# Patient Record
Sex: FEMALE | Race: WHITE | Employment: PART TIME | URBAN - METROPOLITAN AREA
[De-identification: names, ages, dates, MRNs, and addresses within clinical notes are randomized per-mention and may not be internally consistent; named-entity substitution may affect disease eponyms.]

---

## 2017-03-16 ENCOUNTER — OFFICE VISIT (OUTPATIENT)
Dept: PRIMARY CARE CLINIC | Age: 52
End: 2017-03-16
Payer: COMMERCIAL

## 2017-03-16 ENCOUNTER — TELEPHONE (OUTPATIENT)
Dept: PRIMARY CARE CLINIC | Age: 52
End: 2017-03-16

## 2017-03-16 VITALS
SYSTOLIC BLOOD PRESSURE: 112 MMHG | TEMPERATURE: 98.7 F | WEIGHT: 174.2 LBS | DIASTOLIC BLOOD PRESSURE: 69 MMHG | HEART RATE: 100 BPM | RESPIRATION RATE: 18 BRPM | HEIGHT: 64 IN | BODY MASS INDEX: 29.74 KG/M2

## 2017-03-16 DIAGNOSIS — Z13.220 LIPID SCREENING: ICD-10-CM

## 2017-03-16 DIAGNOSIS — Z12.12 SCREENING FOR COLORECTAL CANCER: ICD-10-CM

## 2017-03-16 DIAGNOSIS — K21.9 GASTROESOPHAGEAL REFLUX DISEASE, ESOPHAGITIS PRESENCE NOT SPECIFIED: Primary | ICD-10-CM

## 2017-03-16 DIAGNOSIS — Z12.11 SCREENING FOR COLORECTAL CANCER: ICD-10-CM

## 2017-03-16 DIAGNOSIS — R10.11 RUQ PAIN: ICD-10-CM

## 2017-03-16 DIAGNOSIS — Z12.11 COLON CANCER SCREENING: Primary | ICD-10-CM

## 2017-03-16 PROCEDURE — 99214 OFFICE O/P EST MOD 30 MIN: CPT | Performed by: NURSE PRACTITIONER

## 2017-03-16 ASSESSMENT — ENCOUNTER SYMPTOMS
DIARRHEA: 1
BACK PAIN: 0
SHORTNESS OF BREATH: 0
COUGH: 0
BELCHING: 1
NAUSEA: 0
SORE THROAT: 0
RHINORRHEA: 0
HEARTBURN: 1
WHEEZING: 0
VOMITING: 0
CONSTIPATION: 0
ABDOMINAL PAIN: 1

## 2017-03-17 LAB
APPEARANCE: CLEAR
BILIRUBIN: NEGATIVE
COLOR: YELLOW
GLUCOSE BLD-MCNC: NEGATIVE MG/DL
KETONES, URINE: NEGATIVE
LEUKOCYTE ESTERASE, URINE: NEGATIVE
NITRITE, URINE: NEGATIVE
OCCULT BLOOD,URINE: ABNORMAL
PH: 6 (ref 5–9)
PROTEIN, URINE: NEGATIVE
SP GRAVITY MISCELLANEOUS: 1.02 (ref 1–1.03)
UROBILINOGEN, URINE: NORMAL

## 2017-03-18 PROBLEM — Z12.11 COLON CANCER SCREENING: Status: ACTIVE | Noted: 2017-03-18

## 2017-03-18 LAB
ABSOLUTE BASO #: 0.1 K/UL (ref 0–0.1)
ABSOLUTE EOS #: 0.1 K/UL (ref 0.1–0.4)
ABSOLUTE LYMPH #: 1.1 K/UL (ref 0.8–5.2)
ABSOLUTE MONO #: 0.4 K/UL (ref 0.1–0.9)
ABSOLUTE NEUT #: 2.3 K/UL (ref 1.3–9.1)
ALBUMIN SERPL-MCNC: 4.3 G/DL (ref 3.2–5.3)
ALK PHOSPHATASE: 66 IU/L (ref 35–121)
ALT SERPL-CCNC: 13 IU/L (ref 5–59)
ANION GAP SERPL CALCULATED.3IONS-SCNC: 17 MMOL/L
AST SERPL-CCNC: 17 IU/L (ref 10–42)
BASOPHILS RELATIVE PERCENT: 1.5 %
BILIRUB SERPL-MCNC: 0.9 MG/DL (ref 0.2–1.3)
BUN BLDV-MCNC: 15 MG/DL (ref 10–20)
CALCIUM SERPL-MCNC: 9.1 MG/DL (ref 8.7–10.8)
CHLORIDE BLD-SCNC: 104 MMOL/L (ref 95–111)
CHOLESTEROL/HDL RATIO: 2.6
CHOLESTEROL: 157 MG/DL
CO2: 22 MMOL/L (ref 21–32)
COMMENT: ABNORMAL
CREAT SERPL-MCNC: 0.8 MG/DL (ref 0.5–1.3)
EGFR AFRICAN AMERICAN: 91
EGFR IF NONAFRICAN AMERICAN: 75
EOSINOPHILS RELATIVE PERCENT: 2 %
GLUCOSE: 96 MG/DL (ref 70–100)
HCT VFR BLD CALC: 36 % (ref 36–48)
HDLC SERPL-MCNC: 61 MG/DL (ref 40–60)
HEMOGLOBIN: 10.3 G/DL (ref 12–16)
HYPOCHROMIA: ABNORMAL
LDL CHOLESTEROL CALCULATED: 84 MG/DL
LDL/HDL RATIO: 1.4
LYMPHOCYTE %: 28.9 %
MCH RBC QN AUTO: 19.7 PG (ref 27–34)
MCHC RBC AUTO-ENTMCNC: 28.6 G/DL (ref 31–36)
MCV RBC AUTO: 68.8 FL (ref 80–100)
MICROCYTES: ABNORMAL
MONOCYTES # BLD: 9.1 %
NEUTROPHILS RELATIVE PERCENT: 58.2 %
PDW BLD-RTO: 16.9 % (ref 10.8–14.8)
PLATELETS: 231 K/UL (ref 150–450)
POTASSIUM SERPL-SCNC: 4.1 MMOL/L (ref 3.5–5.4)
RBC: 5.23 M/UL (ref 4–5.5)
SODIUM BLD-SCNC: 139 MMOL/L (ref 134–147)
TOTAL PROTEIN: 7.3 G/DL (ref 5.8–8)
TRIGL SERPL-MCNC: 62 MG/DL
VLDLC SERPL CALC-MCNC: 12 MG/DL
WBC: 3.9 K/UL (ref 3.7–10.8)

## 2017-03-20 ENCOUNTER — TELEPHONE (OUTPATIENT)
Dept: PRIMARY CARE CLINIC | Age: 52
End: 2017-03-20

## 2017-03-20 DIAGNOSIS — D50.9 MICROCYTIC ANEMIA: Primary | ICD-10-CM

## 2017-03-20 LAB — PATHOLOGIST REVIEW: NORMAL

## 2017-03-22 ENCOUNTER — HOSPITAL ENCOUNTER (OUTPATIENT)
Dept: ULTRASOUND IMAGING | Age: 52
Discharge: HOME | End: 2017-03-22
Payer: COMMERCIAL

## 2017-03-22 ENCOUNTER — HOSPITAL ENCOUNTER (OUTPATIENT)
Dept: NUCLEAR MEDICINE | Age: 52
Discharge: HOME | End: 2017-03-22
Payer: COMMERCIAL

## 2017-03-22 ENCOUNTER — TELEPHONE (OUTPATIENT)
Dept: PRIMARY CARE CLINIC | Age: 52
End: 2017-03-22

## 2017-03-22 ENCOUNTER — APPOINTMENT (OUTPATIENT)
Dept: NUCLEAR MEDICINE | Age: 52
End: 2017-03-22

## 2017-03-22 DIAGNOSIS — R10.11 RUQ PAIN: ICD-10-CM

## 2017-03-22 PROCEDURE — 76705 ECHO EXAM OF ABDOMEN: CPT

## 2017-03-22 PROCEDURE — 78226 HEPATOBILIARY SYSTEM IMAGING: CPT

## 2017-03-22 PROCEDURE — A9537 TC99M MEBROFENIN: HCPCS | Performed by: NURSE PRACTITIONER

## 2017-03-22 PROCEDURE — 3430000000 HC RX DIAGNOSTIC RADIOPHARMACEUTICAL: Performed by: NURSE PRACTITIONER

## 2017-03-22 RX ADMIN — Medication 5 MILLICURIE: at 11:05

## 2017-04-05 ENCOUNTER — ANESTHESIA EVENT (OUTPATIENT)
Dept: OPERATING ROOM | Age: 52
End: 2017-04-05
Payer: COMMERCIAL

## 2017-04-05 ENCOUNTER — HOSPITAL ENCOUNTER (OUTPATIENT)
Age: 52
Setting detail: OUTPATIENT SURGERY
Discharge: HOME | End: 2017-04-05
Attending: INTERNAL MEDICINE | Admitting: INTERNAL MEDICINE
Payer: COMMERCIAL

## 2017-04-05 ENCOUNTER — ANESTHESIA (OUTPATIENT)
Dept: OPERATING ROOM | Age: 52
End: 2017-04-05
Payer: COMMERCIAL

## 2017-04-05 VITALS
HEART RATE: 68 BPM | RESPIRATION RATE: 16 BRPM | OXYGEN SATURATION: 97 % | HEIGHT: 65 IN | SYSTOLIC BLOOD PRESSURE: 104 MMHG | BODY MASS INDEX: 27.99 KG/M2 | WEIGHT: 168 LBS | DIASTOLIC BLOOD PRESSURE: 52 MMHG | TEMPERATURE: 97.4 F

## 2017-04-05 VITALS
RESPIRATION RATE: 17 BRPM | OXYGEN SATURATION: 98 % | DIASTOLIC BLOOD PRESSURE: 43 MMHG | SYSTOLIC BLOOD PRESSURE: 80 MMHG

## 2017-04-05 PROCEDURE — 2580000003 HC RX 258: Performed by: INTERNAL MEDICINE

## 2017-04-05 PROCEDURE — 2500000003 HC RX 250 WO HCPCS: Performed by: NURSE ANESTHETIST, CERTIFIED REGISTERED

## 2017-04-05 PROCEDURE — 7100000010 HC PHASE II RECOVERY - FIRST 15 MIN: Performed by: INTERNAL MEDICINE

## 2017-04-05 PROCEDURE — 45378 DIAGNOSTIC COLONOSCOPY: CPT | Performed by: INTERNAL MEDICINE

## 2017-04-05 PROCEDURE — 3700000000 HC ANESTHESIA ATTENDED CARE: Performed by: INTERNAL MEDICINE

## 2017-04-05 PROCEDURE — 7100000011 HC PHASE II RECOVERY - ADDTL 15 MIN: Performed by: INTERNAL MEDICINE

## 2017-04-05 PROCEDURE — 3609027000 HC COLONOSCOPY: Performed by: INTERNAL MEDICINE

## 2017-04-05 PROCEDURE — 3700000001 HC ADD 15 MINUTES (ANESTHESIA): Performed by: INTERNAL MEDICINE

## 2017-04-05 PROCEDURE — 6360000002 HC RX W HCPCS: Performed by: NURSE ANESTHETIST, CERTIFIED REGISTERED

## 2017-04-05 RX ORDER — SODIUM CHLORIDE, SODIUM LACTATE, POTASSIUM CHLORIDE, CALCIUM CHLORIDE 600; 310; 30; 20 MG/100ML; MG/100ML; MG/100ML; MG/100ML
INJECTION, SOLUTION INTRAVENOUS CONTINUOUS
Status: DISCONTINUED | OUTPATIENT
Start: 2017-04-05 | End: 2017-04-05 | Stop reason: HOSPADM

## 2017-04-05 RX ORDER — LIDOCAINE HYDROCHLORIDE 20 MG/ML
INJECTION, SOLUTION INFILTRATION; PERINEURAL PRN
Status: DISCONTINUED | OUTPATIENT
Start: 2017-04-05 | End: 2017-04-05 | Stop reason: SDUPTHER

## 2017-04-05 RX ORDER — PROPOFOL 10 MG/ML
INJECTION, EMULSION INTRAVENOUS CONTINUOUS PRN
Status: DISCONTINUED | OUTPATIENT
Start: 2017-04-05 | End: 2017-04-05 | Stop reason: SDUPTHER

## 2017-04-05 RX ADMIN — PROPOFOL 120 MCG/KG/MIN: 10 INJECTION, EMULSION INTRAVENOUS at 15:05

## 2017-04-05 RX ADMIN — LIDOCAINE HYDROCHLORIDE 100 MG: 20 INJECTION, SOLUTION INFILTRATION; PERINEURAL at 15:06

## 2017-04-05 RX ADMIN — SODIUM CHLORIDE, POTASSIUM CHLORIDE, SODIUM LACTATE AND CALCIUM CHLORIDE: 600; 310; 30; 20 INJECTION, SOLUTION INTRAVENOUS at 14:33

## 2017-04-05 ASSESSMENT — PAIN - FUNCTIONAL ASSESSMENT: PAIN_FUNCTIONAL_ASSESSMENT: 0-10

## 2017-04-05 ASSESSMENT — PAIN SCALES - GENERAL
PAINLEVEL_OUTOF10: 0

## 2017-05-02 ENCOUNTER — TELEPHONE (OUTPATIENT)
Dept: PRIMARY CARE CLINIC | Age: 52
End: 2017-05-02

## 2017-05-08 LAB
FERRITIN: 3.2 NG/ML (ref 9–150)
IRON: 16
TOTAL IRON BINDING CAPACITY: 423
VITAMIN B-12: 768

## 2017-05-10 ENCOUNTER — TELEPHONE (OUTPATIENT)
Dept: PRIMARY CARE CLINIC | Age: 52
End: 2017-05-10

## 2017-05-10 DIAGNOSIS — D50.9 MICROCYTIC ANEMIA: ICD-10-CM

## 2017-05-16 ENCOUNTER — TELEPHONE (OUTPATIENT)
Dept: PRIMARY CARE CLINIC | Age: 52
End: 2017-05-16

## 2017-05-16 DIAGNOSIS — D50.9 MICROCYTIC ANEMIA: Primary | ICD-10-CM

## 2017-07-14 LAB
ABSOLUTE BASO #: 0.1 K/UL (ref 0–0.1)
ABSOLUTE EOS #: 0.1 K/UL (ref 0.1–0.4)
ABSOLUTE LYMPH #: 1.5 K/UL (ref 0.8–5.2)
ABSOLUTE MONO #: 0.5 K/UL (ref 0.1–0.9)
ABSOLUTE NEUT #: 3.1 K/UL (ref 1.3–9.1)
BASOPHILS RELATIVE PERCENT: 1.5 %
EOSINOPHILS RELATIVE PERCENT: 2.1 %
FERRITIN: 6.9 NG/ML (ref 10–291)
HCT VFR BLD CALC: 38 % (ref 36–48)
HEMOGLOBIN: 11.6 G/DL (ref 12–16)
LYMPHOCYTE %: 27.9 %
MCH RBC QN AUTO: 22.5 PG (ref 27–34)
MCHC RBC AUTO-ENTMCNC: 30.5 G/DL (ref 31–36)
MCV RBC AUTO: 73.8 FL (ref 80–100)
MONOCYTES # BLD: 9.9 %
NEUTROPHILS RELATIVE PERCENT: 58.4 %
PDW BLD-RTO: 20.9 % (ref 10.8–14.8)
PLATELETS: 171 K/UL (ref 150–450)
RBC: 5.15 M/UL (ref 4–5.5)
WBC: 5.3 K/UL (ref 3.7–10.8)

## 2017-07-15 LAB
IRON SATURATION: 10 % (ref 20–50)
IRON, SERUM: 41 UG/DL (ref 37–145)
TOTAL IRON BINDING CAPACITY: 407 UG/DL (ref 250–450)
UNSATURATED IRON BINDING CAPACITY: 366 UG/DL (ref 112–347)

## 2017-07-17 ENCOUNTER — TELEPHONE (OUTPATIENT)
Dept: PRIMARY CARE CLINIC | Age: 52
End: 2017-07-17

## 2017-07-17 DIAGNOSIS — D50.9 MICROCYTIC ANEMIA: Primary | ICD-10-CM

## 2017-07-20 ENCOUNTER — TELEPHONE (OUTPATIENT)
Dept: ONCOLOGY | Age: 52
End: 2017-07-20

## 2017-07-20 ENCOUNTER — OFFICE VISIT (OUTPATIENT)
Dept: ONCOLOGY | Age: 52
End: 2017-07-20
Payer: COMMERCIAL

## 2017-07-20 VITALS
TEMPERATURE: 98.6 F | SYSTOLIC BLOOD PRESSURE: 146 MMHG | HEART RATE: 90 BPM | WEIGHT: 176.8 LBS | DIASTOLIC BLOOD PRESSURE: 73 MMHG | BODY MASS INDEX: 29.46 KG/M2 | RESPIRATION RATE: 26 BRPM | HEIGHT: 65 IN

## 2017-07-20 DIAGNOSIS — K90.9 INTESTINAL MALABSORPTION, UNSPECIFIED TYPE: ICD-10-CM

## 2017-07-20 DIAGNOSIS — D50.8 OTHER IRON DEFICIENCY ANEMIA: ICD-10-CM

## 2017-07-20 DIAGNOSIS — D50.9 MICROCYTIC ANEMIA: Primary | ICD-10-CM

## 2017-07-20 DIAGNOSIS — K21.9 GASTROESOPHAGEAL REFLUX DISEASE, ESOPHAGITIS PRESENCE NOT SPECIFIED: ICD-10-CM

## 2017-07-20 DIAGNOSIS — R13.10 DYSPHAGIA, UNSPECIFIED TYPE: ICD-10-CM

## 2017-07-20 PROCEDURE — 99204 OFFICE O/P NEW MOD 45 MIN: CPT | Performed by: INTERNAL MEDICINE

## 2017-07-20 PROCEDURE — G8427 DOCREV CUR MEDS BY ELIG CLIN: HCPCS | Performed by: INTERNAL MEDICINE

## 2017-07-20 PROCEDURE — 3014F SCREEN MAMMO DOC REV: CPT | Performed by: INTERNAL MEDICINE

## 2017-07-20 PROCEDURE — G8419 CALC BMI OUT NRM PARAM NOF/U: HCPCS | Performed by: INTERNAL MEDICINE

## 2017-07-20 PROCEDURE — 1036F TOBACCO NON-USER: CPT | Performed by: INTERNAL MEDICINE

## 2017-07-20 PROCEDURE — 3017F COLORECTAL CA SCREEN DOC REV: CPT | Performed by: INTERNAL MEDICINE

## 2017-07-20 RX ORDER — SODIUM CHLORIDE 0.9 % (FLUSH) 0.9 %
10 SYRINGE (ML) INJECTION PRN
Status: CANCELLED | OUTPATIENT
Start: 2017-07-24

## 2017-07-20 RX ORDER — 0.9 % SODIUM CHLORIDE 0.9 %
10 VIAL (ML) INJECTION ONCE
Status: CANCELLED | OUTPATIENT
Start: 2017-07-24 | End: 2017-07-24

## 2017-07-20 RX ORDER — SODIUM CHLORIDE 9 MG/ML
INJECTION, SOLUTION INTRAVENOUS ONCE
Status: CANCELLED | OUTPATIENT
Start: 2017-07-24 | End: 2017-07-24

## 2017-07-20 RX ORDER — DIPHENHYDRAMINE HYDROCHLORIDE 50 MG/ML
50 INJECTION INTRAMUSCULAR; INTRAVENOUS ONCE
Status: CANCELLED | OUTPATIENT
Start: 2017-07-24 | End: 2017-07-24

## 2017-07-20 RX ORDER — METHYLPREDNISOLONE SODIUM SUCCINATE 125 MG/2ML
125 INJECTION, POWDER, LYOPHILIZED, FOR SOLUTION INTRAMUSCULAR; INTRAVENOUS ONCE
Status: CANCELLED | OUTPATIENT
Start: 2017-07-24 | End: 2017-07-24

## 2017-07-20 RX ORDER — SODIUM CHLORIDE 0.9 % (FLUSH) 0.9 %
5 SYRINGE (ML) INJECTION PRN
Status: CANCELLED | OUTPATIENT
Start: 2017-07-24

## 2017-07-20 RX ORDER — SODIUM CHLORIDE 9 MG/ML
100 INJECTION, SOLUTION INTRAVENOUS CONTINUOUS
Status: CANCELLED | OUTPATIENT
Start: 2017-07-24

## 2017-07-20 RX ORDER — HEPARIN SODIUM (PORCINE) LOCK FLUSH IV SOLN 100 UNIT/ML 100 UNIT/ML
500 SOLUTION INTRAVENOUS PRN
Status: CANCELLED | OUTPATIENT
Start: 2017-07-24

## 2017-07-20 ASSESSMENT — ENCOUNTER SYMPTOMS
COUGH: 0
WHEEZING: 0
NAUSEA: 0
COLOR CHANGE: 0
CONSTIPATION: 0
BLOOD IN STOOL: 0
DIARRHEA: 0
EYE ITCHING: 0
SHORTNESS OF BREATH: 0
VOMITING: 0
BACK PAIN: 0
CHEST TIGHTNESS: 0
ABDOMINAL PAIN: 0
EYE REDNESS: 0

## 2017-07-21 ENCOUNTER — HOSPITAL ENCOUNTER (OUTPATIENT)
Dept: INFUSION THERAPY | Age: 52
Discharge: HOME | End: 2017-07-21
Payer: COMMERCIAL

## 2017-07-21 VITALS
TEMPERATURE: 98.1 F | SYSTOLIC BLOOD PRESSURE: 103 MMHG | DIASTOLIC BLOOD PRESSURE: 57 MMHG | HEART RATE: 59 BPM | RESPIRATION RATE: 20 BRPM

## 2017-07-21 DIAGNOSIS — K90.9 INTESTINAL MALABSORPTION, UNSPECIFIED TYPE: ICD-10-CM

## 2017-07-21 DIAGNOSIS — D50.8 OTHER IRON DEFICIENCY ANEMIA: ICD-10-CM

## 2017-07-21 PROCEDURE — 96365 THER/PROPH/DIAG IV INF INIT: CPT

## 2017-07-21 PROCEDURE — 6360000002 HC RX W HCPCS: Performed by: INTERNAL MEDICINE

## 2017-07-21 PROCEDURE — 2580000003 HC RX 258: Performed by: INTERNAL MEDICINE

## 2017-07-21 RX ORDER — SODIUM CHLORIDE 9 MG/ML
INJECTION, SOLUTION INTRAVENOUS ONCE
Status: CANCELLED | OUTPATIENT
Start: 2017-07-24 | End: 2017-07-24

## 2017-07-21 RX ORDER — SODIUM CHLORIDE 0.9 % (FLUSH) 0.9 %
5 SYRINGE (ML) INJECTION PRN
Status: CANCELLED | OUTPATIENT
Start: 2017-07-24

## 2017-07-21 RX ORDER — METHYLPREDNISOLONE SODIUM SUCCINATE 125 MG/2ML
125 INJECTION, POWDER, LYOPHILIZED, FOR SOLUTION INTRAMUSCULAR; INTRAVENOUS ONCE
Status: CANCELLED | OUTPATIENT
Start: 2017-07-24 | End: 2017-07-24

## 2017-07-21 RX ORDER — DIPHENHYDRAMINE HYDROCHLORIDE 50 MG/ML
50 INJECTION INTRAMUSCULAR; INTRAVENOUS ONCE
Status: CANCELLED | OUTPATIENT
Start: 2017-07-24 | End: 2017-07-24

## 2017-07-21 RX ORDER — 0.9 % SODIUM CHLORIDE 0.9 %
10 VIAL (ML) INJECTION ONCE
Status: CANCELLED | OUTPATIENT
Start: 2017-07-24 | End: 2017-07-24

## 2017-07-21 RX ORDER — SODIUM CHLORIDE 9 MG/ML
100 INJECTION, SOLUTION INTRAVENOUS CONTINUOUS
Status: CANCELLED | OUTPATIENT
Start: 2017-07-24

## 2017-07-21 RX ORDER — SODIUM CHLORIDE 9 MG/ML
INJECTION, SOLUTION INTRAVENOUS ONCE
Status: COMPLETED | OUTPATIENT
Start: 2017-07-21 | End: 2017-07-21

## 2017-07-21 RX ORDER — SODIUM CHLORIDE 0.9 % (FLUSH) 0.9 %
10 SYRINGE (ML) INJECTION PRN
Status: CANCELLED | OUTPATIENT
Start: 2017-07-24

## 2017-07-21 RX ORDER — SODIUM CHLORIDE 0.9 % (FLUSH) 0.9 %
10 SYRINGE (ML) INJECTION PRN
Status: DISCONTINUED | OUTPATIENT
Start: 2017-07-21 | End: 2017-07-22 | Stop reason: HOSPADM

## 2017-07-21 RX ORDER — HEPARIN SODIUM (PORCINE) LOCK FLUSH IV SOLN 100 UNIT/ML 100 UNIT/ML
500 SOLUTION INTRAVENOUS PRN
Status: CANCELLED | OUTPATIENT
Start: 2017-07-24

## 2017-07-21 RX ADMIN — SODIUM CHLORIDE: 9 INJECTION, SOLUTION INTRAVENOUS at 12:23

## 2017-07-21 RX ADMIN — Medication 10 ML: at 12:20

## 2017-07-21 RX ADMIN — FERUMOXYTOL 510 MG: 510 INJECTION INTRAVENOUS at 12:28

## 2017-07-28 ENCOUNTER — HOSPITAL ENCOUNTER (OUTPATIENT)
Dept: INFUSION THERAPY | Age: 52
Discharge: HOME | End: 2017-07-28
Payer: COMMERCIAL

## 2017-07-28 VITALS — TEMPERATURE: 98 F | SYSTOLIC BLOOD PRESSURE: 103 MMHG | HEART RATE: 59 BPM | DIASTOLIC BLOOD PRESSURE: 62 MMHG

## 2017-07-28 DIAGNOSIS — K90.9 INTESTINAL MALABSORPTION, UNSPECIFIED TYPE: ICD-10-CM

## 2017-07-28 DIAGNOSIS — D50.8 OTHER IRON DEFICIENCY ANEMIA: ICD-10-CM

## 2017-07-28 DIAGNOSIS — D50.9 IRON DEFICIENCY ANEMIA, UNSPECIFIED IRON DEFICIENCY ANEMIA TYPE: ICD-10-CM

## 2017-07-28 PROCEDURE — 96365 THER/PROPH/DIAG IV INF INIT: CPT

## 2017-07-28 PROCEDURE — 6360000002 HC RX W HCPCS: Performed by: INTERNAL MEDICINE

## 2017-07-28 PROCEDURE — 2580000003 HC RX 258: Performed by: INTERNAL MEDICINE

## 2017-07-28 RX ORDER — 0.9 % SODIUM CHLORIDE 0.9 %
10 VIAL (ML) INJECTION ONCE
Status: CANCELLED | OUTPATIENT
Start: 2017-07-28 | End: 2017-07-28

## 2017-07-28 RX ORDER — DIPHENHYDRAMINE HYDROCHLORIDE 50 MG/ML
50 INJECTION INTRAMUSCULAR; INTRAVENOUS ONCE
Status: CANCELLED | OUTPATIENT
Start: 2017-07-28 | End: 2017-07-28

## 2017-07-28 RX ORDER — HEPARIN SODIUM (PORCINE) LOCK FLUSH IV SOLN 100 UNIT/ML 100 UNIT/ML
500 SOLUTION INTRAVENOUS PRN
Status: CANCELLED | OUTPATIENT
Start: 2017-07-28

## 2017-07-28 RX ORDER — SODIUM CHLORIDE 0.9 % (FLUSH) 0.9 %
10 SYRINGE (ML) INJECTION PRN
Status: DISCONTINUED | OUTPATIENT
Start: 2017-07-28 | End: 2017-07-29 | Stop reason: HOSPADM

## 2017-07-28 RX ORDER — SODIUM CHLORIDE 9 MG/ML
INJECTION, SOLUTION INTRAVENOUS ONCE
Status: COMPLETED | OUTPATIENT
Start: 2017-07-28 | End: 2017-07-28

## 2017-07-28 RX ORDER — SODIUM CHLORIDE 9 MG/ML
INJECTION, SOLUTION INTRAVENOUS ONCE
Status: CANCELLED | OUTPATIENT
Start: 2017-07-28 | End: 2017-07-28

## 2017-07-28 RX ORDER — SODIUM CHLORIDE 9 MG/ML
100 INJECTION, SOLUTION INTRAVENOUS CONTINUOUS
Status: CANCELLED | OUTPATIENT
Start: 2017-07-28

## 2017-07-28 RX ORDER — SODIUM CHLORIDE 0.9 % (FLUSH) 0.9 %
5 SYRINGE (ML) INJECTION PRN
Status: CANCELLED | OUTPATIENT
Start: 2017-07-28

## 2017-07-28 RX ORDER — METHYLPREDNISOLONE SODIUM SUCCINATE 125 MG/2ML
125 INJECTION, POWDER, LYOPHILIZED, FOR SOLUTION INTRAMUSCULAR; INTRAVENOUS ONCE
Status: CANCELLED | OUTPATIENT
Start: 2017-07-28 | End: 2017-07-28

## 2017-07-28 RX ORDER — SODIUM CHLORIDE 0.9 % (FLUSH) 0.9 %
10 SYRINGE (ML) INJECTION PRN
Status: CANCELLED | OUTPATIENT
Start: 2017-07-28

## 2017-07-28 RX ADMIN — Medication 10 ML: at 09:05

## 2017-07-28 RX ADMIN — SODIUM CHLORIDE: 9 INJECTION, SOLUTION INTRAVENOUS at 09:12

## 2017-07-28 RX ADMIN — FERUMOXYTOL 510 MG: 510 INJECTION INTRAVENOUS at 09:25

## 2017-08-24 ENCOUNTER — OFFICE VISIT (OUTPATIENT)
Dept: ONCOLOGY | Age: 52
End: 2017-08-24
Payer: COMMERCIAL

## 2017-08-24 VITALS
WEIGHT: 178 LBS | SYSTOLIC BLOOD PRESSURE: 123 MMHG | BODY MASS INDEX: 29.66 KG/M2 | RESPIRATION RATE: 16 BRPM | DIASTOLIC BLOOD PRESSURE: 80 MMHG | HEIGHT: 65 IN | HEART RATE: 74 BPM | TEMPERATURE: 98.3 F

## 2017-08-24 DIAGNOSIS — D50.9 IRON DEFICIENCY ANEMIA, UNSPECIFIED IRON DEFICIENCY ANEMIA TYPE: ICD-10-CM

## 2017-08-24 DIAGNOSIS — K21.9 GASTROESOPHAGEAL REFLUX DISEASE, ESOPHAGITIS PRESENCE NOT SPECIFIED: ICD-10-CM

## 2017-08-24 DIAGNOSIS — K90.9 INTESTINAL MALABSORPTION, UNSPECIFIED TYPE: ICD-10-CM

## 2017-08-24 DIAGNOSIS — D50.9 MICROCYTIC ANEMIA: ICD-10-CM

## 2017-08-24 DIAGNOSIS — D50.9 MICROCYTIC ANEMIA: Primary | ICD-10-CM

## 2017-08-24 DIAGNOSIS — R13.10 DYSPHAGIA, UNSPECIFIED TYPE: ICD-10-CM

## 2017-08-24 PROCEDURE — 1036F TOBACCO NON-USER: CPT | Performed by: INTERNAL MEDICINE

## 2017-08-24 PROCEDURE — 99213 OFFICE O/P EST LOW 20 MIN: CPT | Performed by: INTERNAL MEDICINE

## 2017-08-24 PROCEDURE — 3017F COLORECTAL CA SCREEN DOC REV: CPT | Performed by: INTERNAL MEDICINE

## 2017-08-24 PROCEDURE — G8427 DOCREV CUR MEDS BY ELIG CLIN: HCPCS | Performed by: INTERNAL MEDICINE

## 2017-08-24 PROCEDURE — G8417 CALC BMI ABV UP PARAM F/U: HCPCS | Performed by: INTERNAL MEDICINE

## 2017-08-24 PROCEDURE — 3014F SCREEN MAMMO DOC REV: CPT | Performed by: INTERNAL MEDICINE

## 2017-08-24 ASSESSMENT — ENCOUNTER SYMPTOMS
BACK PAIN: 0
ABDOMINAL PAIN: 0
CHEST TIGHTNESS: 0
EYE REDNESS: 0
EYE ITCHING: 0
VOMITING: 0
CONSTIPATION: 0
BLOOD IN STOOL: 0
DIARRHEA: 0
COLOR CHANGE: 0
SHORTNESS OF BREATH: 0
NAUSEA: 0
WHEEZING: 0
COUGH: 0

## 2017-08-30 ENCOUNTER — OFFICE VISIT (OUTPATIENT)
Dept: PRIMARY CARE CLINIC | Age: 52
End: 2017-08-30
Payer: COMMERCIAL

## 2017-08-30 VITALS
RESPIRATION RATE: 18 BRPM | BODY MASS INDEX: 29.39 KG/M2 | DIASTOLIC BLOOD PRESSURE: 76 MMHG | WEIGHT: 176.4 LBS | TEMPERATURE: 98 F | SYSTOLIC BLOOD PRESSURE: 117 MMHG | HEIGHT: 65 IN | HEART RATE: 64 BPM

## 2017-08-30 DIAGNOSIS — R53.83 FATIGUE, UNSPECIFIED TYPE: Primary | ICD-10-CM

## 2017-08-30 DIAGNOSIS — R13.10 DYSPHAGIA, UNSPECIFIED TYPE: ICD-10-CM

## 2017-08-30 DIAGNOSIS — R31.29 MICROHEMATURIA: ICD-10-CM

## 2017-08-30 PROCEDURE — G8427 DOCREV CUR MEDS BY ELIG CLIN: HCPCS | Performed by: NURSE PRACTITIONER

## 2017-08-30 PROCEDURE — 3014F SCREEN MAMMO DOC REV: CPT | Performed by: NURSE PRACTITIONER

## 2017-08-30 PROCEDURE — 99214 OFFICE O/P EST MOD 30 MIN: CPT | Performed by: NURSE PRACTITIONER

## 2017-08-30 PROCEDURE — 3017F COLORECTAL CA SCREEN DOC REV: CPT | Performed by: NURSE PRACTITIONER

## 2017-08-30 PROCEDURE — 1036F TOBACCO NON-USER: CPT | Performed by: NURSE PRACTITIONER

## 2017-08-30 PROCEDURE — G8417 CALC BMI ABV UP PARAM F/U: HCPCS | Performed by: NURSE PRACTITIONER

## 2017-08-30 ASSESSMENT — ENCOUNTER SYMPTOMS
COUGH: 0
VOMITING: 0
SWOLLEN GLANDS: 0
VISUAL CHANGE: 0
CHANGE IN BOWEL HABIT: 0
NAUSEA: 0
ABDOMINAL PAIN: 0
SORE THROAT: 1

## 2017-08-30 ASSESSMENT — PATIENT HEALTH QUESTIONNAIRE - PHQ9
SUM OF ALL RESPONSES TO PHQ QUESTIONS 1-9: 0
SUM OF ALL RESPONSES TO PHQ9 QUESTIONS 1 & 2: 0
2. FEELING DOWN, DEPRESSED OR HOPELESS: 0
1. LITTLE INTEREST OR PLEASURE IN DOING THINGS: 0

## 2017-08-31 ENCOUNTER — HOSPITAL ENCOUNTER (OUTPATIENT)
Dept: ULTRASOUND IMAGING | Age: 52
Discharge: HOME | End: 2017-08-31
Payer: COMMERCIAL

## 2017-08-31 DIAGNOSIS — R53.83 FATIGUE, UNSPECIFIED TYPE: ICD-10-CM

## 2017-08-31 DIAGNOSIS — R13.10 DYSPHAGIA, UNSPECIFIED TYPE: ICD-10-CM

## 2017-08-31 PROCEDURE — 76536 US EXAM OF HEAD AND NECK: CPT

## 2017-09-01 LAB
APPEARANCE: CLEAR
BILIRUBIN: NEGATIVE
COLOR: YELLOW
GLUCOSE BLD-MCNC: NEGATIVE MG/DL
KETONES, URINE: NEGATIVE
LEUKOCYTE ESTERASE, URINE: NEGATIVE
NITRITE, URINE: NEGATIVE
OCCULT BLOOD,URINE: NEGATIVE
PH: 7 (ref 5–9)
PROTEIN, URINE: NEGATIVE
SP GRAVITY MISCELLANEOUS: <1.005 (ref 1–1.03)
T4 FREE: 0.97 NG/DL (ref 0.8–1.8)
TSH SERPL DL<=0.05 MIU/L-ACNC: 2 UIU/ML (ref 0.4–4.4)
UROBILINOGEN, URINE: NORMAL

## 2017-09-06 ENCOUNTER — TELEPHONE (OUTPATIENT)
Dept: PRIMARY CARE CLINIC | Age: 52
End: 2017-09-06

## 2017-09-07 ENCOUNTER — TELEPHONE (OUTPATIENT)
Dept: PRIMARY CARE CLINIC | Age: 52
End: 2017-09-07

## 2017-11-21 DIAGNOSIS — K90.9 INTESTINAL MALABSORPTION, UNSPECIFIED TYPE: ICD-10-CM

## 2017-11-21 DIAGNOSIS — D50.9 MICROCYTIC ANEMIA: ICD-10-CM

## 2017-11-21 DIAGNOSIS — K21.9 GASTROESOPHAGEAL REFLUX DISEASE, ESOPHAGITIS PRESENCE NOT SPECIFIED: ICD-10-CM

## 2017-11-21 DIAGNOSIS — R13.10 DYSPHAGIA, UNSPECIFIED TYPE: ICD-10-CM

## 2017-11-21 DIAGNOSIS — D50.9 IRON DEFICIENCY ANEMIA, UNSPECIFIED IRON DEFICIENCY ANEMIA TYPE: ICD-10-CM

## 2017-12-05 ENCOUNTER — OFFICE VISIT (OUTPATIENT)
Dept: OBGYN | Age: 52
End: 2017-12-05
Payer: COMMERCIAL

## 2017-12-05 VITALS
DIASTOLIC BLOOD PRESSURE: 80 MMHG | SYSTOLIC BLOOD PRESSURE: 122 MMHG | HEIGHT: 65 IN | BODY MASS INDEX: 29.49 KG/M2 | WEIGHT: 177 LBS

## 2017-12-05 DIAGNOSIS — Z01.419 WOMEN'S ANNUAL ROUTINE GYNECOLOGICAL EXAMINATION: Primary | ICD-10-CM

## 2017-12-05 PROCEDURE — 99396 PREV VISIT EST AGE 40-64: CPT | Performed by: ADVANCED PRACTICE MIDWIFE

## 2017-12-05 ASSESSMENT — ENCOUNTER SYMPTOMS: SORE THROAT: 0

## 2017-12-05 NOTE — PROGRESS NOTES
YEARLY PHYSICAL    Date of service: 2017    Etienne Souza  Is a 46 y.o.   female    PT's PCP is: Shalini Williamson CNP     : 1965                                             Subjective:       Patient's last menstrual period was 2017 (exact date).      Are your menses regular: yes    OB History    Para Term  AB Living   4       2 2   SAB TAB Ectopic Molar Multiple Live Births   2                # Outcome Date GA Lbr Roger/2nd Weight Sex Delivery Anes PTL Lv   4             3             2 SAB            1 SAB                    History   Smoking Status    Never Smoker   Smokeless Tobacco    Never Used        History   Alcohol Use    Yes     Comment: Occ       Family History   Problem Relation Age of Onset    High Blood Pressure Mother     High Blood Pressure Father     Other Father     Kidney Disease Maternal Grandmother     Stroke Maternal Grandfather     Heart Disease Maternal Grandfather     Cancer Paternal Grandmother     Heart Disease Paternal Grandfather        Allergies: Penicillins; Vancomycin; and Wellbutrin [bupropion]      Current Outpatient Prescriptions:     Prenatal Vit-Iron Carbonyl-FA (PRENATAL PLUS IRON PO), Take by mouth OTC, Disp: , Rfl:     History   Sexual Activity    Sexual activity: Yes    Partners: Male       Any bleeding or pain with intercourse: No    Last Yearly:  2016    Last pap: 2016    Last HPV: 2016    Last Mammogram: 2016    Last Dexascan never    Do you do self breast exams: Yes    Past Medical History:   Diagnosis Date    Iron deficiency anemia     Ovarian cyst        Past Surgical History:   Procedure Laterality Date     SECTION      x2    COLONOSCOPY          CYST REMOVAL Right     ovary    [place] COLON CA SCRN NOT HI [facility] IND N/A 2017    -normal    UPPER GASTROINTESTINAL ENDOSCOPY  12-10-13       Family History   Problem Relation Age of Onset    High Blood Pressure Mother     High Blood Pressure Father     Other Father     Kidney Disease Maternal Grandmother     Stroke Maternal Grandfather     Heart Disease Maternal Grandfather     Cancer Paternal Grandmother     Heart Disease Paternal Grandfather        Chief Complaint   Patient presents with    Gynecologic Exam     Yearly exam. Last pap 11- neg., HPV not detected. PE:  Vital Signs  Blood pressure 122/80, height 5' 5\" (1.651 m), weight 177 lb (80.3 kg), last menstrual period 11/13/2017, not currently breastfeeding. Labs:    No results found for this visit on 12/05/17. NURSE: Celina YI    HPI: here for annual exam    Review of systems:  PT denies fever, chills, nausea and vomiting     Review of Systems   Constitutional: Negative. HENT: Negative for congestion and sore throat. Skin: Negative for rash. Objective  Lymphatic:   no lymphadenopathy  Heent:   negative   Cor: regular rate and rhythm, no murmurs              Pul:clear to auscultation bilaterally- no wheezes, rales or rhonchi, normal air movement, no respiratory distress      GI: Abdomen soft, non-tender. BS normal. No masses,  No organomegaly           Extremities: normal strength, tone, and muscle mass   Breasts: Breast:normal appearance, no masses or tenderness   Pelvic Exam: GENITAL/URINARY:  External Genitalia:  General appearance; normal, Hair distribution; normal, Lesions absent  Urethral Meatus:  Size normal, Location normal, Lesions absent, Prolapse absent  Urethra:   Fullness absent, Masses absent  Bladder:  Fullness absent, Masses absent, Tenderness absent, Cystocele absent  Vagina:  General appearance normal, Estrogen effect normal, Discharge absent, Lesions absent, Pelvic support normal  Cervix:  General appearance normal, Lesions absent, Discharge absent, Tenderness absent, Enlargement absent, Nodularity absent  Uterus:  Size normal, Tenderness absent  [employer]:

## 2017-12-18 ENCOUNTER — OFFICE VISIT (OUTPATIENT)
Dept: ONCOLOGY | Age: 52
End: 2017-12-18
Payer: COMMERCIAL

## 2017-12-18 VITALS
SYSTOLIC BLOOD PRESSURE: 147 MMHG | TEMPERATURE: 98.6 F | HEART RATE: 74 BPM | BODY MASS INDEX: 29.32 KG/M2 | DIASTOLIC BLOOD PRESSURE: 79 MMHG | WEIGHT: 176 LBS | HEIGHT: 65 IN

## 2017-12-18 DIAGNOSIS — K21.9 GASTROESOPHAGEAL REFLUX DISEASE, ESOPHAGITIS PRESENCE NOT SPECIFIED: ICD-10-CM

## 2017-12-18 DIAGNOSIS — D50.9 IRON DEFICIENCY ANEMIA, UNSPECIFIED IRON DEFICIENCY ANEMIA TYPE: Primary | ICD-10-CM

## 2017-12-18 PROCEDURE — G8484 FLU IMMUNIZE NO ADMIN: HCPCS | Performed by: INTERNAL MEDICINE

## 2017-12-18 PROCEDURE — 3017F COLORECTAL CA SCREEN DOC REV: CPT | Performed by: INTERNAL MEDICINE

## 2017-12-18 PROCEDURE — 3014F SCREEN MAMMO DOC REV: CPT | Performed by: INTERNAL MEDICINE

## 2017-12-18 PROCEDURE — 1036F TOBACCO NON-USER: CPT | Performed by: INTERNAL MEDICINE

## 2017-12-18 PROCEDURE — G8427 DOCREV CUR MEDS BY ELIG CLIN: HCPCS | Performed by: INTERNAL MEDICINE

## 2017-12-18 PROCEDURE — 99213 OFFICE O/P EST LOW 20 MIN: CPT | Performed by: INTERNAL MEDICINE

## 2017-12-18 PROCEDURE — G8417 CALC BMI ABV UP PARAM F/U: HCPCS | Performed by: INTERNAL MEDICINE

## 2017-12-18 RX ORDER — ASCORBIC ACID 500 MG
500 TABLET ORAL DAILY
COMMUNITY

## 2017-12-18 ASSESSMENT — ENCOUNTER SYMPTOMS
SHORTNESS OF BREATH: 0
WHEEZING: 0
BACK PAIN: 0
COUGH: 0
NAUSEA: 0
BLOOD IN STOOL: 0
EYE ITCHING: 0
ABDOMINAL PAIN: 0
CHEST TIGHTNESS: 0
COLOR CHANGE: 0
DIARRHEA: 0
CONSTIPATION: 0
VOMITING: 0
EYE REDNESS: 0

## 2017-12-18 NOTE — LETTER
12/18/2017     Kaya Williamson, TOÑO   [address]    Dear Dr. Emma Tovar, TOÑO, and Dr. Dajuan Juarez ref. provider found: Thank you for referring Nicci Tenorio, 1965, to me for evaluation. Below are the relevant portions of my assessment and plan of care. [facility]  [facility]  [address]  [place] [address] [id number]  Dept: [phone]  Dept Fax: [phone]  Manuel Mcknight is a 46 y.o. female who presents today for follow up of her   Chief Complaint   Patient presents with    Anemia         HPI  Thuy Flaherty is a 80-year-old lady who was seen by her PCP in March of this year for abdominal and right upper quadrant pain. .  She had an ultrasound of her gallbladder as well as hepatobiliary scan which were all negative. On routine blood work she was found to be severely iron deficient and her hemoglobin was in the 10 g range. She was prescribed iron once a day and had  been taking that for a couple of months. Her hemoglobin as well as the iron stores had improved very slightly. Serum ferritin was still 6.9, iron saturation was  10 and TIBC was 407. She also takes a PPI. She still has her menstrual periods but claims that they are normal and not excessively heavy. She had a colonoscopy done in April which was negative. Her last EGD was in 2013 which was also negative. Her main complaint is of heartburn or acid reflux. She received a course of IV iron  and her hemoglobin is now improved to 14 g and her iron stores are now  replenished and normal.  She states that she is feeling a lot more energetic. Current Outpatient Prescriptions   Medication Sig Dispense Refill    vitamin C (ASCORBIC ACID) 500 MG tablet Take 500 mg by mouth daily      MULTIPLE VITAMIN PO Take 2 tablets by mouth daily      Prenatal Vit-Iron Carbonyl-FA (PRENATAL PLUS IRON PO) Take by mouth OTC       No current facility-administered medications for this visit.         Allergies Allergen Reactions    Penicillins     Vancomycin     Wellbutrin [Bupropion]        Past Medical History:   Diagnosis Date    Iron deficiency anemia     Ovarian cyst         Past Surgical History:   Procedure Laterality Date     SECTION      x2    COLONOSCOPY      2005    CYST REMOVAL Right     ovary    [place] COLON CA SCRN NOT HI [facility] IND N/A 2017    -normal    UPPER GASTROINTESTINAL ENDOSCOPY  12-10-13       Family History   Problem Relation Age of Onset    High Blood Pressure Mother     High Blood Pressure Father     Other Father     Kidney Disease Maternal Grandmother     Stroke Maternal Grandfather     Heart Disease Maternal Grandfather     Cancer Paternal Grandmother     Heart Disease Paternal Grandfather        Social History   Substance Use Topics    Smoking status: Never Smoker    Smokeless tobacco: Never Used    Alcohol use Yes      Comment: Occ          The Past Medical History, Past Surgical History, Past Family History and Past Social History have been reviewed      Review of Systems   Constitutional: Negative for activity change, appetite change, chills, diaphoresis, fatigue, fever and unexpected weight change. HENT: Negative for congestion, hearing loss, mouth sores and nosebleeds. Eyes: Negative for redness, itching and visual disturbance. Respiratory: Negative for cough, chest tightness, shortness of breath and wheezing. Cardiovascular: Negative for chest pain, palpitations and leg swelling. Gastrointestinal: Negative for abdominal pain, blood in stool, constipation, diarrhea, nausea and vomiting. Genitourinary: Negative for decreased urine volume, difficulty urinating, dysuria, flank pain, frequency, hematuria, pelvic pain and urgency. Musculoskeletal: Negative for arthralgias, back pain, joint swelling and myalgias. Skin: Negative for color change, pallor and rash.    Neurological: Negative for dizziness, seizures, syncope, weakness, light-headedness, numbness and headaches. Hematological: Negative for adenopathy. Does not bruise/bleed easily. Psychiatric/Behavioral: Negative for agitation, behavioral problems and confusion. Physical Exam   Constitutional: She is oriented to person, place, and time. She appears well-developed and well-nourished. No distress. HENT:   Head: Normocephalic. Eyes: Pupils are equal, round, and reactive to light. No scleral icterus. Neck: Neck supple. No thyromegaly present. Cardiovascular: Normal rate and regular rhythm. No murmur heard. Pulmonary/Chest: Effort normal and breath sounds normal. No respiratory distress. She has no wheezes. Right breast exhibits no mass. Left breast exhibits no mass. Abdominal: Soft. She exhibits no mass. There is no hepatosplenomegaly. There is no tenderness. Musculoskeletal: Normal range of motion. She exhibits no edema or tenderness. Lymphadenopathy:     She has no cervical adenopathy. She has no axillary adenopathy. Neurological: She is alert and oriented to person, place, and time. No cranial nerve deficit. Skin: Skin is warm and dry. No cyanosis. Nails show no clubbing. Psychiatric: She has a normal mood and affect. Her behavior is normal. Thought content normal.   Nursing note and vitals reviewed.     Results for orders placed or performed in visit on 08/30/17   TSH without Reflex   Result Value Ref Range    TSH 2.000 0.40 - 4.40 uIU/mL   FREE T4   Result Value Ref Range    T4 Free 0.97 0.80 - 1.80 ng/dl   Urinalysis   Result Value Ref Range    Color, UA YELLOW     Appearance CLEAR     Spec Grav, Fluid <1.005 1.001 - 1.03    pH 7.0 5.0 - 9.0    Protein, Urine NEGATIVE NEGATIVE,    Glucose NEGATIVE NEGATIVE    Ketones, Urine NEGATIVE NEGATIVE    Bilirubin NEGATIVE NEGATIVE    Occult Blood,Urine NEGATIVE NEGATIVE    Urobilinogen, Urine NORMAL NORMAL    Nitrite, Urine NEGATIVE NEGATIVE    Leukocyte Esterase, Urine NEGATIVE NEGATIVE ASSESSMENT:     1. Iron deficiency anemia, unspecified iron deficiency anemia type  CBC Auto Differential    Comprehensive Metabolic Panel    Ferritin    Iron and TIBC    Vitamin B12 & Folate    Soluble transferrin receptor   2. Gastroesophageal reflux disease, esophagitis presence not specified  CBC Auto Differential    Comprehensive Metabolic Panel    Ferritin    Iron and TIBC    Vitamin B12 & Folate    Soluble transferrin receptor     Iron deficiency anemia has improved after IV iron. I have advised her to continue taking the oral iron for now and will reevaluate her back again in 3 months. PLAN:     Return in about 3 months (around 3/18/2018) for iron deficiency anemia. Orders Placed This Encounter   Procedures    CBC Auto Differential     Standing Status:   Future     Standing Expiration Date:   12/18/2018    Comprehensive Metabolic Panel     Standing Status:   Future     Standing Expiration Date:   12/18/2018    Ferritin     Standing Status:   Future     Standing Expiration Date:   12/18/2018    Iron and TIBC     Standing Status:   Future     Standing Expiration Date:   12/18/2018     Order Specific Question:   Is Patient Fasting? Answer:   No     Order Specific Question:   No of Hours? Answer:   No    Vitamin B12 & Folate     Standing Status:   Future     Standing Expiration Date:   12/18/2018    Soluble transferrin receptor     Standing Status:   Future     Standing Expiration Date:   12/18/2018     No orders of the defined types were placed in this encounter. Electronically signed by Khari Parisi MD on 12/18/2017 at 3:09 PM      If you have questions, please do not hesitate to call me. I look forward to following Scooteranderson Johnston along with you.     Sincerely,        Khari Parisi MD  Phone: [phone]

## 2018-01-02 ENCOUNTER — HOSPITAL ENCOUNTER (OUTPATIENT)
Dept: WOMENS IMAGING | Age: 53
Discharge: HOME | End: 2018-01-02
Payer: COMMERCIAL

## 2018-01-02 DIAGNOSIS — Z01.419 WOMEN'S ANNUAL ROUTINE GYNECOLOGICAL EXAMINATION: ICD-10-CM

## 2018-01-02 PROCEDURE — 77067 SCR MAMMO BI INCL CAD: CPT

## 2018-03-15 ENCOUNTER — OFFICE VISIT (OUTPATIENT)
Dept: PRIMARY CARE CLINIC | Age: 53
End: 2018-03-15
Payer: COMMERCIAL

## 2018-03-15 VITALS
SYSTOLIC BLOOD PRESSURE: 121 MMHG | HEART RATE: 66 BPM | HEIGHT: 65 IN | WEIGHT: 175.1 LBS | BODY MASS INDEX: 29.17 KG/M2 | DIASTOLIC BLOOD PRESSURE: 74 MMHG | RESPIRATION RATE: 18 BRPM | TEMPERATURE: 97.4 F

## 2018-03-15 DIAGNOSIS — K21.9 GASTROESOPHAGEAL REFLUX DISEASE, ESOPHAGITIS PRESENCE NOT SPECIFIED: Primary | ICD-10-CM

## 2018-03-15 DIAGNOSIS — D50.9 IRON DEFICIENCY ANEMIA, UNSPECIFIED IRON DEFICIENCY ANEMIA TYPE: ICD-10-CM

## 2018-03-15 PROCEDURE — 3017F COLORECTAL CA SCREEN DOC REV: CPT | Performed by: NURSE PRACTITIONER

## 2018-03-15 PROCEDURE — G8417 CALC BMI ABV UP PARAM F/U: HCPCS | Performed by: NURSE PRACTITIONER

## 2018-03-15 PROCEDURE — 3014F SCREEN MAMMO DOC REV: CPT | Performed by: NURSE PRACTITIONER

## 2018-03-15 PROCEDURE — 99214 OFFICE O/P EST MOD 30 MIN: CPT | Performed by: NURSE PRACTITIONER

## 2018-03-15 PROCEDURE — G8427 DOCREV CUR MEDS BY ELIG CLIN: HCPCS | Performed by: NURSE PRACTITIONER

## 2018-03-15 PROCEDURE — 1036F TOBACCO NON-USER: CPT | Performed by: NURSE PRACTITIONER

## 2018-03-15 PROCEDURE — G8484 FLU IMMUNIZE NO ADMIN: HCPCS | Performed by: NURSE PRACTITIONER

## 2018-03-15 ASSESSMENT — ENCOUNTER SYMPTOMS
VOMITING: 0
COUGH: 0
HEARTBURN: 0
ABDOMINAL PAIN: 0
RHINORRHEA: 0
BELCHING: 0
SHORTNESS OF BREATH: 0
SORE THROAT: 0
CONSTIPATION: 0
WHEEZING: 0
DIARRHEA: 0
NAUSEA: 0

## 2018-03-15 NOTE — PROGRESS NOTES
Name: Joe Amos  : 1965         Chief Complaint:     Chief Complaint   Patient presents with    Gastroesophageal Reflux     Routine office visit. History of Present Illness:      Joe Amos is a 48 y.o.  female who presents with Gastroesophageal Reflux (Routine office visit. )      Ramu Sierra is here today for a routine office visit. ANEMIA- treated by hematology, has had iron infusions with improvement in hemoglobin and hematocrit. Patient states she feels no fatigue at this time. Gastroesophageal Reflux   She reports no abdominal pain, no belching, no chest pain, no coughing, no heartburn, no nausea, no sore throat or no wheezing. This is a chronic problem. The current episode started 1 to 4 weeks ago. The problem occurs rarely. The problem has been unchanged. The heartburn does not wake her from sleep. The heartburn does not limit her activity. The heartburn doesn't change with position. The symptoms are aggravated by certain foods. Associated symptoms include anemia. Pertinent negatives include no fatigue, muscle weakness or orthopnea. Risk factors include caffeine use. She has tried a PPI and a histamine-2 antagonist (AS NEEDED) for the symptoms. The treatment provided significant relief. Past procedures include an EGD. Past procedures do not include a UGI. Past invasive treatments do not include gastroplasty, gastroplication or reflux surgery. Past Medical History:     Past Medical History:   Diagnosis Date    Iron deficiency anemia     Ovarian cyst       Reviewed all health maintenance requirements and ordered appropriate tests  There are no preventive care reminders to display for this patient.     Past Surgical History:     Past Surgical History:   Procedure Laterality Date     SECTION      x2    COLONOSCOPY      2005    CYST REMOVAL Right     ovary    [place] COLON [place] SCRN NOT  [address] [facility] N/A 2017    -normal    UPPER GASTROINTESTINAL

## 2018-03-16 DIAGNOSIS — D50.9 IRON DEFICIENCY ANEMIA, UNSPECIFIED IRON DEFICIENCY ANEMIA TYPE: ICD-10-CM

## 2018-03-16 DIAGNOSIS — K21.9 GASTROESOPHAGEAL REFLUX DISEASE, ESOPHAGITIS PRESENCE NOT SPECIFIED: ICD-10-CM

## 2018-03-22 ENCOUNTER — OFFICE VISIT (OUTPATIENT)
Dept: ONCOLOGY | Age: 53
End: 2018-03-22
Payer: COMMERCIAL

## 2018-03-22 VITALS
TEMPERATURE: 98 F | BODY MASS INDEX: 29.19 KG/M2 | WEIGHT: 175.2 LBS | HEART RATE: 65 BPM | SYSTOLIC BLOOD PRESSURE: 100 MMHG | HEIGHT: 65 IN | RESPIRATION RATE: 18 BRPM | DIASTOLIC BLOOD PRESSURE: 64 MMHG

## 2018-03-22 DIAGNOSIS — D50.9 IRON DEFICIENCY ANEMIA, UNSPECIFIED IRON DEFICIENCY ANEMIA TYPE: Primary | ICD-10-CM

## 2018-03-22 DIAGNOSIS — K90.9 INTESTINAL MALABSORPTION, UNSPECIFIED TYPE: ICD-10-CM

## 2018-03-22 PROCEDURE — 3014F SCREEN MAMMO DOC REV: CPT | Performed by: INTERNAL MEDICINE

## 2018-03-22 PROCEDURE — 3017F COLORECTAL CA SCREEN DOC REV: CPT | Performed by: INTERNAL MEDICINE

## 2018-03-22 PROCEDURE — 1036F TOBACCO NON-USER: CPT | Performed by: INTERNAL MEDICINE

## 2018-03-22 PROCEDURE — G8484 FLU IMMUNIZE NO ADMIN: HCPCS | Performed by: INTERNAL MEDICINE

## 2018-03-22 PROCEDURE — G8427 DOCREV CUR MEDS BY ELIG CLIN: HCPCS | Performed by: INTERNAL MEDICINE

## 2018-03-22 PROCEDURE — 99213 OFFICE O/P EST LOW 20 MIN: CPT | Performed by: INTERNAL MEDICINE

## 2018-03-22 PROCEDURE — G8417 CALC BMI ABV UP PARAM F/U: HCPCS | Performed by: INTERNAL MEDICINE

## 2018-03-22 ASSESSMENT — ENCOUNTER SYMPTOMS
BACK PAIN: 0
COLOR CHANGE: 0
WHEEZING: 0
COUGH: 0
VOMITING: 0
SHORTNESS OF BREATH: 0
ABDOMINAL PAIN: 0
EYE REDNESS: 0
BLOOD IN STOOL: 0
NAUSEA: 0
CONSTIPATION: 0
CHEST TIGHTNESS: 0
EYE ITCHING: 0
DIARRHEA: 0

## 2018-03-22 NOTE — LETTER
Past Medical History:   Diagnosis Date    Iron deficiency anemia     Ovarian cyst         Past Surgical History:   Procedure Laterality Date     SECTION      x2    COLONOSCOPY      2005    CYST REMOVAL Right     ovary    [place] COLON CA SCRN NOT  [address] IND N/A 2017    -normal    UPPER GASTROINTESTINAL ENDOSCOPY  12-10-13       Family History   Problem Relation Age of Onset    High Blood Pressure Mother     High Blood Pressure Father     Other Father     Kidney Disease Maternal Grandmother     Stroke Maternal Grandfather     Heart Disease Maternal Grandfather     Cancer Paternal Grandmother     Heart Disease Paternal Grandfather        Social History   Substance Use Topics    Smoking status: Never Smoker    Smokeless tobacco: Never Used    Alcohol use Yes      Comment: Occ          The Past Medical History, Past Surgical History, Past Family History and Past Social History have been reviewed      Review of Systems   Constitutional: Negative for activity change, appetite change, chills, diaphoresis, fatigue, fever and unexpected weight change. HENT: Negative for congestion, hearing loss, mouth sores and nosebleeds. Eyes: Negative for redness, itching and visual disturbance. Respiratory: Negative for cough, chest tightness, shortness of breath and wheezing. Cardiovascular: Negative for chest pain, palpitations and leg swelling. Gastrointestinal: Negative for abdominal pain, blood in stool, constipation, diarrhea, nausea and vomiting. Genitourinary: Negative for decreased urine volume, difficulty urinating, dysuria, flank pain, frequency, hematuria, pelvic pain and urgency. Musculoskeletal: Negative for arthralgias, back pain, joint swelling and myalgias. Skin: Negative for color change, pallor and rash. Neurological: Negative for dizziness, seizures, syncope, weakness, light-headedness, numbness and headaches. 1. Iron deficiency anemia, unspecified iron deficiency anemia type  CBC Auto Differential    Comprehensive Metabolic Panel    Soluble transferrin receptor    Ferritin    Iron and TIBC    Vitamin B12 & Folate   2. Intestinal malabsorption, unspecified type  CBC Auto Differential    Comprehensive Metabolic Panel    Soluble transferrin receptor    Ferritin    Iron and TIBC    Vitamin B12 & Folate     Iron deficiency anemia has improved after IV iron. I have advised her to continue taking the oral iron for now and will reevaluate her back again in 3 months. PLAN:     Return in about 3 months (around 6/22/2018) for iron deficiency anemia. Orders Placed This Encounter   Procedures    CBC Auto Differential     Standing Status:   Future     Standing Expiration Date:   3/22/2019    Comprehensive Metabolic Panel     Standing Status:   Future     Standing Expiration Date:   3/22/2019    Soluble transferrin receptor     Standing Status:   Future     Standing Expiration Date:   3/22/2019    Ferritin     Standing Status:   Future     Standing Expiration Date:   3/22/2019    Iron and TIBC     Standing Status:   Future     Standing Expiration Date:   3/22/2019     Order Specific Question:   Is Patient Fasting? Answer:   No     Order Specific Question:   No of Hours? Answer:   No    Vitamin B12 & Folate     Standing Status:   Future     Standing Expiration Date:   3/22/2019     No orders of the defined types were placed in this encounter. Electronically signed by Allan Gutierrez MD on 3/22/2018 at 3:22 PM      If you have questions, please do not hesitate to call me. I look forward to following Heber Lopez along with you.     Sincerely,        Allan Gutierrez MD  Phone: [phone]

## 2018-03-22 NOTE — PROGRESS NOTES
Vitamin B12 & Folate     Iron deficiency anemia has improved after IV iron. I have advised her to continue taking the oral iron for now and will reevaluate her back again in 3 months. PLAN:     Return in about 3 months (around 6/22/2018) for iron deficiency anemia. Orders Placed This Encounter   Procedures    CBC Auto Differential     Standing Status:   Future     Standing Expiration Date:   3/22/2019    Comprehensive Metabolic Panel     Standing Status:   Future     Standing Expiration Date:   3/22/2019    Soluble transferrin receptor     Standing Status:   Future     Standing Expiration Date:   3/22/2019    Ferritin     Standing Status:   Future     Standing Expiration Date:   3/22/2019    Iron and TIBC     Standing Status:   Future     Standing Expiration Date:   3/22/2019     Order Specific Question:   Is Patient Fasting? Answer:   No     Order Specific Question:   No of Hours? Answer:   No    Vitamin B12 & Folate     Standing Status:   Future     Standing Expiration Date:   3/22/2019     No orders of the defined types were placed in this encounter.           Electronically signed by Jair Dwyer MD on 3/22/2018 at 3:22 PM

## 2018-06-21 DIAGNOSIS — K90.9 INTESTINAL MALABSORPTION, UNSPECIFIED TYPE: ICD-10-CM

## 2018-06-21 DIAGNOSIS — D50.9 IRON DEFICIENCY ANEMIA, UNSPECIFIED IRON DEFICIENCY ANEMIA TYPE: ICD-10-CM

## 2018-06-25 ENCOUNTER — OFFICE VISIT (OUTPATIENT)
Dept: ONCOLOGY | Age: 53
End: 2018-06-25
Payer: COMMERCIAL

## 2018-06-25 VITALS
SYSTOLIC BLOOD PRESSURE: 123 MMHG | TEMPERATURE: 98.1 F | DIASTOLIC BLOOD PRESSURE: 77 MMHG | HEART RATE: 66 BPM | WEIGHT: 178 LBS | HEIGHT: 64 IN | RESPIRATION RATE: 16 BRPM | BODY MASS INDEX: 30.39 KG/M2

## 2018-06-25 DIAGNOSIS — D50.9 IRON DEFICIENCY ANEMIA, UNSPECIFIED IRON DEFICIENCY ANEMIA TYPE: Primary | ICD-10-CM

## 2018-06-25 DIAGNOSIS — K90.9 INTESTINAL MALABSORPTION, UNSPECIFIED TYPE: ICD-10-CM

## 2018-06-25 PROCEDURE — G8427 DOCREV CUR MEDS BY ELIG CLIN: HCPCS | Performed by: INTERNAL MEDICINE

## 2018-06-25 PROCEDURE — 3017F COLORECTAL CA SCREEN DOC REV: CPT | Performed by: INTERNAL MEDICINE

## 2018-06-25 PROCEDURE — 1036F TOBACCO NON-USER: CPT | Performed by: INTERNAL MEDICINE

## 2018-06-25 PROCEDURE — G8417 CALC BMI ABV UP PARAM F/U: HCPCS | Performed by: INTERNAL MEDICINE

## 2018-06-25 PROCEDURE — 99213 OFFICE O/P EST LOW 20 MIN: CPT | Performed by: INTERNAL MEDICINE

## 2018-06-25 ASSESSMENT — ENCOUNTER SYMPTOMS
NAUSEA: 0
EYE ITCHING: 0
WHEEZING: 0
DIARRHEA: 0
EYE REDNESS: 0
BACK PAIN: 0
COLOR CHANGE: 0
CONSTIPATION: 0
ABDOMINAL PAIN: 0
SHORTNESS OF BREATH: 0
BLOOD IN STOOL: 0
VOMITING: 0
CHEST TIGHTNESS: 0
COUGH: 0

## 2018-09-19 DIAGNOSIS — D50.9 IRON DEFICIENCY ANEMIA, UNSPECIFIED IRON DEFICIENCY ANEMIA TYPE: ICD-10-CM

## 2018-09-24 ENCOUNTER — OFFICE VISIT (OUTPATIENT)
Dept: ONCOLOGY | Age: 53
End: 2018-09-24
Payer: COMMERCIAL

## 2018-09-24 VITALS
SYSTOLIC BLOOD PRESSURE: 117 MMHG | HEIGHT: 64 IN | BODY MASS INDEX: 31.18 KG/M2 | RESPIRATION RATE: 20 BRPM | DIASTOLIC BLOOD PRESSURE: 76 MMHG | TEMPERATURE: 98.2 F | HEART RATE: 66 BPM | WEIGHT: 182.6 LBS

## 2018-09-24 DIAGNOSIS — K90.9 INTESTINAL MALABSORPTION, UNSPECIFIED TYPE: ICD-10-CM

## 2018-09-24 DIAGNOSIS — D50.9 IRON DEFICIENCY ANEMIA, UNSPECIFIED IRON DEFICIENCY ANEMIA TYPE: Primary | ICD-10-CM

## 2018-09-24 PROCEDURE — 99213 OFFICE O/P EST LOW 20 MIN: CPT | Performed by: INTERNAL MEDICINE

## 2018-09-24 PROCEDURE — G8417 CALC BMI ABV UP PARAM F/U: HCPCS | Performed by: INTERNAL MEDICINE

## 2018-09-24 PROCEDURE — 1036F TOBACCO NON-USER: CPT | Performed by: INTERNAL MEDICINE

## 2018-09-24 PROCEDURE — 3017F COLORECTAL CA SCREEN DOC REV: CPT | Performed by: INTERNAL MEDICINE

## 2018-09-24 PROCEDURE — G8427 DOCREV CUR MEDS BY ELIG CLIN: HCPCS | Performed by: INTERNAL MEDICINE

## 2018-09-24 ASSESSMENT — ENCOUNTER SYMPTOMS
WHEEZING: 0
VOMITING: 0
COUGH: 0
DIARRHEA: 0
EYE REDNESS: 0
CONSTIPATION: 0
ABDOMINAL PAIN: 0
BACK PAIN: 0
EYE ITCHING: 0
BLOOD IN STOOL: 0
SHORTNESS OF BREATH: 0
CHEST TIGHTNESS: 0
COLOR CHANGE: 0
NAUSEA: 0

## 2018-09-24 NOTE — LETTER
9/24/2018     Roel Williamson, APRN - CNP   [address]    Dear Dr. Margo Tran, APRN - CNP, and Dr. Sury Escobedo ref. provider found: Thank you for referring Nubia Montoya, 1965, to me for evaluation. Below are the relevant portions of my assessment and plan of care. [facility]  [facility]  [address]  [phone]  Dept: [phone]  Dept Fax: [phone]  Errol Salinas is a 48 y.o. female who presents today for follow up of her   Chief Complaint   Patient presents with    Anemia         HPI Amber Rodgers is a 48year-old lady who On routine blood work  was found to be severely iron deficient and her hemoglobin was in the 10 g range. She was prescribed iron once a day With very slight change in her iron stores. . Serum ferritin was still 6.9, iron saturation was  10 and TIBC was 407. Chanda Pham She still has her menstrual periods but claims that they are normal and not excessively heavy. She had a colonoscopy done in April which was negative. Her last EGD was in 2013 which was also negative. Her main complaint is of heartburn or acid reflux. She received a course of IV iron  and her hemoglobin is now 15.8 g and her iron stores are now  replenished and normal.  She states that she is feeling a lot more energetic. She is also taking oral iron. Current Outpatient Prescriptions   Medication Sig Dispense Refill    NONFORMULARY Mellaluca Digestive enzyme, one daily      vitamin C (ASCORBIC ACID) 500 MG tablet Take 500 mg by mouth daily      MULTIPLE VITAMIN PO Take 2 tablets by mouth daily      Prenatal Vit-Iron Carbonyl-FA (PRENATAL PLUS IRON PO) Take by mouth daily OTC        No current facility-administered medications for this visit.         Allergies   Allergen Reactions    Penicillins     Vancomycin     Wellbutrin [Bupropion]        Past Medical History:   Diagnosis Date    Iron deficiency anemia     Ovarian cyst Past Surgical History:   Procedure Laterality Date     SECTION      x2    COLONOSCOPY      2005    CYST REMOVAL Right     ovary    [place] COLON CA SCRN NOT  [address] IND N/A 2017    -normal    UPPER GASTROINTESTINAL ENDOSCOPY  12-10-13       Family History   Problem Relation Age of Onset    High Blood Pressure Mother     High Blood Pressure Father     Other Father     Kidney Disease Maternal Grandmother     Stroke Maternal Grandfather     Heart Disease Maternal Grandfather     Cancer Paternal Grandmother     Heart Disease Paternal Grandfather        Social History   Substance Use Topics    Smoking status: Never Smoker    Smokeless tobacco: Never Used    Alcohol use Yes      Comment: Occ          The Past Medical History, Past Surgical History, Past Family History and Past Social History have been reviewed      Review of Systems   Constitutional: Negative for activity change, appetite change, chills, diaphoresis, fatigue, fever and unexpected weight change. HENT: Negative for congestion, hearing loss, mouth sores and nosebleeds. Eyes: Negative for redness, itching and visual disturbance. Respiratory: Negative for cough, chest tightness, shortness of breath and wheezing. Cardiovascular: Negative for chest pain, palpitations and leg swelling. Gastrointestinal: Negative for abdominal pain, blood in stool, constipation, diarrhea, nausea and vomiting. Genitourinary: Negative for decreased urine volume, difficulty urinating, dysuria, flank pain, frequency, hematuria, pelvic pain and urgency. Musculoskeletal: Negative for arthralgias, back pain, joint swelling and myalgias. Skin: Negative for color change, pallor and rash. Neurological: Negative for dizziness, seizures, syncope, weakness, light-headedness, numbness and headaches. Hematological: Negative for adenopathy. Does not bruise/bleed easily.    Psychiatric/Behavioral: Negative for agitation, behavioral problems and confusion. Physical Exam   Constitutional: She is oriented to person, place, and time. She appears well-developed and well-nourished. No distress. HENT:   Head: Normocephalic. Eyes: Pupils are equal, round, and reactive to light. No scleral icterus. Neck: Neck supple. No thyromegaly present. Cardiovascular: Normal rate and regular rhythm. No murmur heard. Pulmonary/Chest: Effort normal and breath sounds normal. No respiratory distress. She has no wheezes. Right breast exhibits no mass. Left breast exhibits no mass. Abdominal: Soft. She exhibits no mass. There is no hepatosplenomegaly. There is no tenderness. Musculoskeletal: Normal range of motion. She exhibits no edema or tenderness. Lymphadenopathy:     She has no cervical adenopathy. She has no axillary adenopathy. Neurological: She is alert and oriented to person, place, and time. No cranial nerve deficit. Skin: Skin is warm and dry. No cyanosis. Nails show no clubbing. Psychiatric: She has a normal mood and affect. Her behavior is normal. Thought content normal.   Nursing note and vitals reviewed. Results for orders placed or performed in visit on 08/30/17   TSH without Reflex   Result Value Ref Range    TSH 2.000 0.40 - 4.40 uIU/mL   FREE T4   Result Value Ref Range    T4 Free 0.97 0.80 - 1.80 ng/dl   Urinalysis   Result Value Ref Range    Color, UA YELLOW     Appearance CLEAR     Spec Grav, Fluid <1.005 1.001 - 1.03    pH 7.0 5.0 - 9.0    Protein, Urine NEGATIVE NEGATIVE,    Glucose NEGATIVE NEGATIVE    Ketones, Urine NEGATIVE NEGATIVE    Bilirubin NEGATIVE NEGATIVE    Occult Blood,Urine NEGATIVE NEGATIVE    Urobilinogen, Urine NORMAL NORMAL    Nitrite, Urine NEGATIVE NEGATIVE    Leukocyte Esterase, Urine NEGATIVE NEGATIVE       ASSESSMENT:      Diagnosis Orders   1.  Iron deficiency anemia, unspecified iron deficiency anemia type  CBC Auto Differential    Comprehensive Metabolic Panel    Soluble transferrin receptor Ferritin    Iron and TIBC    Vitamin B12 & Folate   2. Intestinal malabsorption, unspecified type       Iron deficiency anemia has improved after IV iron. I have advised her to continue taking the oral iron for now and will reevaluate her back again in 4 months. PLAN:     Return in about 4 months (around 1/24/2019) for iron deficiency anemia. Orders Placed This Encounter   Procedures    CBC Auto Differential     Standing Status:   Future     Standing Expiration Date:   9/24/2019    Comprehensive Metabolic Panel     Standing Status:   Future     Standing Expiration Date:   9/24/2019    Soluble transferrin receptor     Standing Status:   Future     Standing Expiration Date:   9/24/2019    Ferritin     Standing Status:   Future     Standing Expiration Date:   9/24/2019    Iron and TIBC     Standing Status:   Future     Standing Expiration Date:   9/24/2019     Order Specific Question:   Is Patient Fasting? Answer:   No     Order Specific Question:   No of Hours? Answer:   No    Vitamin B12 & Folate     Standing Status:   Future     Standing Expiration Date:   9/24/2019     No orders of the defined types were placed in this encounter. Electronically signed by Raúl Fuller MD on 9/24/2018 at 2:58 PM      If you have questions, please do not hesitate to call me. I look forward to following Sandra Bassmaryellen along with you.     Sincerely,        Raúl Fuller MD  Phone: [phone]

## 2018-09-24 NOTE — PROGRESS NOTES
Cardiovascular: Normal rate and regular rhythm. No murmur heard. Pulmonary/Chest: Effort normal and breath sounds normal. No respiratory distress. She has no wheezes. Right breast exhibits no mass. Left breast exhibits no mass. Abdominal: Soft. She exhibits no mass. There is no hepatosplenomegaly. There is no tenderness. Musculoskeletal: Normal range of motion. She exhibits no edema or tenderness. Lymphadenopathy:     She has no cervical adenopathy. She has no axillary adenopathy. Neurological: She is alert and oriented to person, place, and time. No cranial nerve deficit. Skin: Skin is warm and dry. No cyanosis. Nails show no clubbing. Psychiatric: She has a normal mood and affect. Her behavior is normal. Thought content normal.   Nursing note and vitals reviewed. Results for orders placed or performed in visit on 08/30/17   TSH without Reflex   Result Value Ref Range    TSH 2.000 0.40 - 4.40 uIU/mL   FREE T4   Result Value Ref Range    T4 Free 0.97 0.80 - 1.80 ng/dl   Urinalysis   Result Value Ref Range    Color, UA YELLOW     Appearance CLEAR     Spec Grav, Fluid <1.005 1.001 - 1.03    pH 7.0 5.0 - 9.0    Protein, Urine NEGATIVE NEGATIVE,    Glucose NEGATIVE NEGATIVE    Ketones, Urine NEGATIVE NEGATIVE    Bilirubin NEGATIVE NEGATIVE    Occult Blood,Urine NEGATIVE NEGATIVE    Urobilinogen, Urine NORMAL NORMAL    Nitrite, Urine NEGATIVE NEGATIVE    Leukocyte Esterase, Urine NEGATIVE NEGATIVE       ASSESSMENT:      Diagnosis Orders   1. Iron deficiency anemia, unspecified iron deficiency anemia type  CBC Auto Differential    Comprehensive Metabolic Panel    Soluble transferrin receptor    Ferritin    Iron and TIBC    Vitamin B12 & Folate   2. Intestinal malabsorption, unspecified type       Iron deficiency anemia has improved after IV iron. I have advised her to continue taking the oral iron for now and will reevaluate her back again in 4 months.      PLAN:     Return in about 4 months

## 2018-09-26 PROBLEM — Z12.11 COLON CANCER SCREENING: Status: RESOLVED | Noted: 2017-03-18 | Resolved: 2018-09-26

## 2018-12-11 ENCOUNTER — OFFICE VISIT (OUTPATIENT)
Dept: OBGYN | Age: 53
End: 2018-12-11
Payer: COMMERCIAL

## 2018-12-11 VITALS
HEIGHT: 65 IN | BODY MASS INDEX: 30.32 KG/M2 | WEIGHT: 182 LBS | SYSTOLIC BLOOD PRESSURE: 124 MMHG | DIASTOLIC BLOOD PRESSURE: 80 MMHG

## 2018-12-11 DIAGNOSIS — Z01.419 WOMEN'S ANNUAL ROUTINE GYNECOLOGICAL EXAMINATION: Primary | ICD-10-CM

## 2018-12-11 PROCEDURE — 99396 PREV VISIT EST AGE 40-64: CPT | Performed by: ADVANCED PRACTICE MIDWIFE

## 2018-12-11 PROCEDURE — G8484 FLU IMMUNIZE NO ADMIN: HCPCS | Performed by: ADVANCED PRACTICE MIDWIFE

## 2018-12-11 ASSESSMENT — PATIENT HEALTH QUESTIONNAIRE - PHQ9
SUM OF ALL RESPONSES TO PHQ QUESTIONS 1-9: 0
2. FEELING DOWN, DEPRESSED OR HOPELESS: 0
1. LITTLE INTEREST OR PLEASURE IN DOING THINGS: 0
SUM OF ALL RESPONSES TO PHQ9 QUESTIONS 1 & 2: 0
SUM OF ALL RESPONSES TO PHQ QUESTIONS 1-9: 0

## 2018-12-16 ASSESSMENT — ENCOUNTER SYMPTOMS
ABDOMINAL PAIN: 0
BACK PAIN: 0
CONSTIPATION: 0
DIARRHEA: 0
SORE THROAT: 0
NAUSEA: 0
SHORTNESS OF BREATH: 0

## 2019-01-21 ENCOUNTER — HOSPITAL ENCOUNTER (OUTPATIENT)
Dept: WOMENS IMAGING | Age: 54
Discharge: HOME | End: 2019-01-23
Payer: COMMERCIAL

## 2019-01-21 DIAGNOSIS — Z01.419 WOMEN'S ANNUAL ROUTINE GYNECOLOGICAL EXAMINATION: ICD-10-CM

## 2019-01-21 PROCEDURE — 77067 SCR MAMMO BI INCL CAD: CPT

## 2019-01-28 ENCOUNTER — OFFICE VISIT (OUTPATIENT)
Dept: ONCOLOGY | Age: 54
End: 2019-01-28
Payer: COMMERCIAL

## 2019-01-28 VITALS
WEIGHT: 181 LBS | RESPIRATION RATE: 16 BRPM | TEMPERATURE: 97.7 F | DIASTOLIC BLOOD PRESSURE: 65 MMHG | BODY MASS INDEX: 30.12 KG/M2 | HEART RATE: 73 BPM | SYSTOLIC BLOOD PRESSURE: 106 MMHG

## 2019-01-28 DIAGNOSIS — D50.9 IRON DEFICIENCY ANEMIA, UNSPECIFIED IRON DEFICIENCY ANEMIA TYPE: ICD-10-CM

## 2019-01-28 DIAGNOSIS — D50.9 IRON DEFICIENCY ANEMIA, UNSPECIFIED IRON DEFICIENCY ANEMIA TYPE: Primary | ICD-10-CM

## 2019-01-28 DIAGNOSIS — K90.9 INTESTINAL MALABSORPTION, UNSPECIFIED TYPE: ICD-10-CM

## 2019-01-28 PROCEDURE — 99213 OFFICE O/P EST LOW 20 MIN: CPT | Performed by: INTERNAL MEDICINE

## 2019-01-28 PROCEDURE — G8427 DOCREV CUR MEDS BY ELIG CLIN: HCPCS | Performed by: INTERNAL MEDICINE

## 2019-01-28 PROCEDURE — 1036F TOBACCO NON-USER: CPT | Performed by: INTERNAL MEDICINE

## 2019-01-28 PROCEDURE — 3017F COLORECTAL CA SCREEN DOC REV: CPT | Performed by: INTERNAL MEDICINE

## 2019-01-28 PROCEDURE — G8484 FLU IMMUNIZE NO ADMIN: HCPCS | Performed by: INTERNAL MEDICINE

## 2019-01-28 PROCEDURE — G8417 CALC BMI ABV UP PARAM F/U: HCPCS | Performed by: INTERNAL MEDICINE

## 2019-01-28 ASSESSMENT — ENCOUNTER SYMPTOMS
NAUSEA: 0
COLOR CHANGE: 0
CHEST TIGHTNESS: 0
SHORTNESS OF BREATH: 0
COUGH: 0
ABDOMINAL PAIN: 0
CONSTIPATION: 0
WHEEZING: 0
BLOOD IN STOOL: 0
BACK PAIN: 0
VOMITING: 0
EYE REDNESS: 0
EYE ITCHING: 0
DIARRHEA: 0

## 2019-02-25 ENCOUNTER — OFFICE VISIT (OUTPATIENT)
Dept: PRIMARY CARE CLINIC | Age: 54
End: 2019-02-25
Payer: COMMERCIAL

## 2019-02-25 ENCOUNTER — HOSPITAL ENCOUNTER (OUTPATIENT)
Dept: CT IMAGING | Age: 54
Discharge: HOME | End: 2019-02-27
Payer: COMMERCIAL

## 2019-02-25 ENCOUNTER — HOSPITAL ENCOUNTER (OUTPATIENT)
Age: 54
Discharge: HOME | End: 2019-02-25
Payer: COMMERCIAL

## 2019-02-25 ENCOUNTER — TELEPHONE (OUTPATIENT)
Dept: PRIMARY CARE CLINIC | Age: 54
End: 2019-02-25

## 2019-02-25 VITALS
TEMPERATURE: 97.5 F | DIASTOLIC BLOOD PRESSURE: 68 MMHG | SYSTOLIC BLOOD PRESSURE: 105 MMHG | WEIGHT: 184.6 LBS | BODY MASS INDEX: 30.75 KG/M2 | HEIGHT: 65 IN | HEART RATE: 79 BPM | RESPIRATION RATE: 16 BRPM

## 2019-02-25 DIAGNOSIS — H60.391 OTHER INFECTIVE ACUTE OTITIS EXTERNA OF RIGHT EAR: Primary | ICD-10-CM

## 2019-02-25 DIAGNOSIS — H74.91 MASTOID DISORDER, RIGHT: ICD-10-CM

## 2019-02-25 LAB
ABSOLUTE EOS #: 0.13 K/UL (ref 0–0.44)
ABSOLUTE IMMATURE GRANULOCYTE: <0.03 K/UL (ref 0–0.3)
ABSOLUTE LYMPH #: 1.67 K/UL (ref 1.1–3.7)
ABSOLUTE MONO #: 0.52 K/UL (ref 0.1–1.2)
BASOPHILS # BLD: 1 % (ref 0–2)
BASOPHILS ABSOLUTE: 0.06 K/UL (ref 0–0.2)
DIFFERENTIAL TYPE: NORMAL
EOSINOPHILS RELATIVE PERCENT: 2 % (ref 1–4)
HCT VFR BLD CALC: 43.6 % (ref 36.3–47.1)
HEMOGLOBIN: 14.4 G/DL (ref 11.9–15.1)
IMMATURE GRANULOCYTES: 0 %
LYMPHOCYTES # BLD: 24 % (ref 24–43)
MCH RBC QN AUTO: 31.1 PG (ref 25.2–33.5)
MCHC RBC AUTO-ENTMCNC: 33 G/DL (ref 28.4–34.8)
MCV RBC AUTO: 94.2 FL (ref 82.6–102.9)
MONOCYTES # BLD: 8 % (ref 3–12)
NRBC AUTOMATED: 0 PER 100 WBC
PDW BLD-RTO: 12.1 % (ref 11.8–14.4)
PLATELET # BLD: 144 K/UL (ref 138–453)
PLATELET ESTIMATE: NORMAL
PMV BLD AUTO: 9.7 FL (ref 8.1–13.5)
RBC # BLD: 4.63 M/UL (ref 3.95–5.11)
RBC # BLD: NORMAL 10*6/UL
SEG NEUTROPHILS: 65 % (ref 36–65)
SEGMENTED NEUTROPHILS ABSOLUTE COUNT: 4.51 K/UL (ref 1.5–8.1)
WBC # BLD: 6.9 K/UL (ref 3.5–11.3)
WBC # BLD: NORMAL 10*3/UL

## 2019-02-25 PROCEDURE — 99213 OFFICE O/P EST LOW 20 MIN: CPT | Performed by: NURSE PRACTITIONER

## 2019-02-25 PROCEDURE — 85025 COMPLETE CBC W/AUTO DIFF WBC: CPT

## 2019-02-25 PROCEDURE — 4130F TOPICAL PREP RX AOE: CPT | Performed by: NURSE PRACTITIONER

## 2019-02-25 PROCEDURE — G8484 FLU IMMUNIZE NO ADMIN: HCPCS | Performed by: NURSE PRACTITIONER

## 2019-02-25 PROCEDURE — G8427 DOCREV CUR MEDS BY ELIG CLIN: HCPCS | Performed by: NURSE PRACTITIONER

## 2019-02-25 PROCEDURE — G8417 CALC BMI ABV UP PARAM F/U: HCPCS | Performed by: NURSE PRACTITIONER

## 2019-02-25 PROCEDURE — 36415 COLL VENOUS BLD VENIPUNCTURE: CPT

## 2019-02-25 PROCEDURE — 3017F COLORECTAL CA SCREEN DOC REV: CPT | Performed by: NURSE PRACTITIONER

## 2019-02-25 PROCEDURE — 1036F TOBACCO NON-USER: CPT | Performed by: NURSE PRACTITIONER

## 2019-02-25 PROCEDURE — 70450 CT HEAD/BRAIN W/O DYE: CPT

## 2019-02-25 RX ORDER — CIPROFLOXACIN 500 MG/1
500 TABLET, FILM COATED ORAL 2 TIMES DAILY
Qty: 20 TABLET | Refills: 0 | Status: SHIPPED | OUTPATIENT
Start: 2019-02-25 | End: 2019-02-25 | Stop reason: SDUPTHER

## 2019-02-25 RX ORDER — CIPROFLOXACIN 500 MG/1
500 TABLET, FILM COATED ORAL 2 TIMES DAILY
Qty: 20 TABLET | Refills: 0 | Status: SHIPPED | OUTPATIENT
Start: 2019-02-25 | End: 2019-03-07

## 2019-02-25 ASSESSMENT — ENCOUNTER SYMPTOMS
DIARRHEA: 0
SORE THROAT: 0
VOMITING: 0
RHINORRHEA: 0
ABDOMINAL PAIN: 0
COUGH: 0

## 2019-02-25 ASSESSMENT — PATIENT HEALTH QUESTIONNAIRE - PHQ9
SUM OF ALL RESPONSES TO PHQ QUESTIONS 1-9: 0
SUM OF ALL RESPONSES TO PHQ9 QUESTIONS 1 & 2: 0
SUM OF ALL RESPONSES TO PHQ QUESTIONS 1-9: 0
1. LITTLE INTEREST OR PLEASURE IN DOING THINGS: 0
2. FEELING DOWN, DEPRESSED OR HOPELESS: 0

## 2019-02-28 ENCOUNTER — TELEPHONE (OUTPATIENT)
Dept: PRIMARY CARE CLINIC | Age: 54
End: 2019-02-28

## 2019-05-19 LAB
ABSOLUTE BASO #: 0.1 K/UL (ref 0–0.1)
ABSOLUTE EOS #: 0.1 K/UL (ref 0.1–0.4)
ABSOLUTE LYMPH #: 1.3 K/UL (ref 0.8–5.2)
ABSOLUTE MONO #: 0.4 K/UL (ref 0.1–0.9)
ABSOLUTE NEUT #: 3.2 K/UL (ref 1.3–9.1)
ALBUMIN SERPL-MCNC: 3.8 G/DL (ref 3.2–5.3)
ALK PHOSPHATASE: 59 U/L (ref 39–130)
ALT SERPL-CCNC: 18 U/L (ref 0–31)
ANION GAP SERPL CALCULATED.3IONS-SCNC: 7 MMOL/L (ref 4–12)
AST SERPL-CCNC: 20 U/L (ref 0–41)
BASOPHILS RELATIVE PERCENT: 1.2 %
BILIRUB SERPL-MCNC: 1.1 MG/DL (ref 0.3–1.2)
BUN BLDV-MCNC: 15 MG/DL (ref 5–23)
CALCIUM SERPL-MCNC: 9 MG/DL (ref 8.5–10.5)
CHLORIDE BLD-SCNC: 106 MMOL/L (ref 98–109)
CO2: 27 MMOL/L (ref 22–32)
CREAT SERPL-MCNC: 0.82 MG/DL (ref 0.4–1)
EGFR AFRICAN AMERICAN: >60 ML/MIN/1.73SQ.M
EGFR IF NONAFRICAN AMERICAN: >60 ML/MIN/1.73SQ.M
EOSINOPHILS RELATIVE PERCENT: 2.6 %
FERRITIN: 27 NG/ML (ref 11–307)
FOLATE: >25 NG/ML
GLUCOSE: 90 MG/DL (ref 65–99)
HCT VFR BLD CALC: 41.2 % (ref 36–48)
HEMOGLOBIN: 14.2 G/DL (ref 12–16)
IRON SATURATION: 32 % SATURATION (ref 15–50)
IRON, SERUM: 94 UG/DL (ref 50–170)
LYMPHOCYTE %: 24.9 %
MCH RBC QN AUTO: 31.3 PG (ref 27–34)
MCHC RBC AUTO-ENTMCNC: 34.5 G/DL (ref 31–36)
MCV RBC AUTO: 90.7 FL (ref 80–100)
MONOCYTES # BLD: 8.1 %
NEUTROPHILS RELATIVE PERCENT: 63 %
PDW BLD-RTO: 12.3 % (ref 10.8–14.8)
PLATELETS: 163 K/UL (ref 150–450)
POTASSIUM SERPL-SCNC: 4.2 MMOL/L (ref 3.5–5)
RBC: 4.54 M/UL (ref 4–5.5)
SODIUM BLD-SCNC: 140 MMOL/L (ref 134–146)
TOTAL IRON BINDING CAPACITY: 291 UG/DL (ref 250–425)
TOTAL PROTEIN: 6.4 G/DL (ref 6–8)
VITAMIN B-12: 688 PG/ML (ref 180–914)
WBC: 5.1 K/UL (ref 3.7–10.8)

## 2019-05-23 LAB — SOLUBLE TRANSFERRIN RECEPT: 3 MG/L (ref 1.9–4.4)

## 2019-05-30 ENCOUNTER — OFFICE VISIT (OUTPATIENT)
Dept: ONCOLOGY | Age: 54
End: 2019-05-30
Payer: COMMERCIAL

## 2019-05-30 VITALS
TEMPERATURE: 97.7 F | HEART RATE: 75 BPM | WEIGHT: 181 LBS | SYSTOLIC BLOOD PRESSURE: 126 MMHG | BODY MASS INDEX: 30.16 KG/M2 | HEIGHT: 65 IN | DIASTOLIC BLOOD PRESSURE: 72 MMHG | RESPIRATION RATE: 18 BRPM

## 2019-05-30 DIAGNOSIS — D50.9 IRON DEFICIENCY ANEMIA, UNSPECIFIED IRON DEFICIENCY ANEMIA TYPE: Primary | ICD-10-CM

## 2019-05-30 DIAGNOSIS — K90.9 INTESTINAL MALABSORPTION, UNSPECIFIED TYPE: ICD-10-CM

## 2019-05-30 PROCEDURE — G8427 DOCREV CUR MEDS BY ELIG CLIN: HCPCS | Performed by: INTERNAL MEDICINE

## 2019-05-30 PROCEDURE — 3017F COLORECTAL CA SCREEN DOC REV: CPT | Performed by: INTERNAL MEDICINE

## 2019-05-30 PROCEDURE — 1036F TOBACCO NON-USER: CPT | Performed by: INTERNAL MEDICINE

## 2019-05-30 PROCEDURE — G8417 CALC BMI ABV UP PARAM F/U: HCPCS | Performed by: INTERNAL MEDICINE

## 2019-05-30 PROCEDURE — 99213 OFFICE O/P EST LOW 20 MIN: CPT | Performed by: INTERNAL MEDICINE

## 2019-05-30 ASSESSMENT — ENCOUNTER SYMPTOMS
CONSTIPATION: 0
EYE REDNESS: 0
SHORTNESS OF BREATH: 0
WHEEZING: 0
DIARRHEA: 0
EYE ITCHING: 0
NAUSEA: 0
COUGH: 0
BLOOD IN STOOL: 0
ABDOMINAL PAIN: 0
COLOR CHANGE: 0
VOMITING: 0
CHEST TIGHTNESS: 0
BACK PAIN: 0

## 2019-05-30 NOTE — LETTER
5/30/2019     Jose Carlos Williamson, APRN - CNP   [address]    Dear Dr. Hazel Collins, APRN - CNP, and Dr. Becka Mcclure ref. provider found: Thank you for referring Avel Lechuga, 1965, to me for evaluation. Below are the relevant portions of my assessment and plan of care. [facility] PHYSICIANS  [facility]  [address]  [place] [phone]  Dept: [phone]  Dept Fax: [phone]  Stacia Barajas is a 47 y.o. female who presentstoday for follow up of her   Chief Complaint   Patient presents with    Anemia         HPI   Fito Salcido is a 47year-old lady who On routine blood work  was found to be severely iron deficient and her hemoglobin was in the 10 g range. She was prescribed iron once a day With very slight change in her iron stores. . Serum ferritin was still 6.9, iron saturation was  10 and TIBC was 407. Dana Never She still has her menstrual periods but claims that they are normal and not excessively heavy. She had a colonoscopy done in April which was negative. Her last EGD was in 2013 which was also negative. Her main complaint is of heartburn or acid reflux. She received a course of IV iron  and her hemoglobin is now 14.2 g which has dropped by a gram since 4 months ago and her iron stores are   normal.  She states that she is feeling good. She is also taking oral iron. Current Outpatient Medications   Medication Sig Dispense Refill    NONFORMULARY Mellaluca Digestive enzyme, one daily      vitamin C (ASCORBIC ACID) 500 MG tablet Take 500 mg by mouth daily      MULTIPLE VITAMIN PO Take 2 tablets by mouth daily      Prenatal Vit-Iron Carbonyl-FA (PRENATAL PLUS IRON PO) Take by mouth daily OTC        No current facility-administered medications for this visit.         Allergies   Allergen Reactions    Penicillins     Vancomycin     Wellbutrin [Bupropion]        Past Medical History:   Diagnosis Date    Iron deficiency anemia     Ovarian cyst Past Surgical History:   Procedure Laterality Date     SECTION      x2    COLONOSCOPY      2005    CYST REMOVAL Right     ovary    [place] COLON CA SCRN NOT  [address] IND N/A 2017    -normal    UPPER GASTROINTESTINAL ENDOSCOPY  12-10-13       Family History   Problem Relation Age of Onset    High Blood Pressure Mother     High Blood Pressure Father     Other Father     Kidney Disease Maternal Grandmother     Stroke Maternal Grandfather     Heart Disease Maternal Grandfather     Cancer Paternal Grandmother     Heart Disease Paternal Grandfather        Social History     Tobacco Use    Smoking status: Never Smoker    Smokeless tobacco: Never Used   Substance Use Topics    Alcohol use: Yes     Comment: Occ          The Past MedicalHistory, Past Surgical History, Past Family History and Past Social History havebeen reviewed      Review of Systems   Constitutional: Negative for activity change, appetite change, chills, diaphoresis, fatigue, fever and unexpected weight change. HENT: Negative for congestion, hearing loss, mouth sores and nosebleeds. Eyes: Negative for redness, itching and visual disturbance. Respiratory: Negative for cough, chest tightness, shortness of breath and wheezing. Cardiovascular: Negative for chest pain, palpitations and leg swelling. Gastrointestinal: Negative for abdominal pain, blood in stool, constipation, diarrhea, nausea and vomiting. Genitourinary: Negative for decreased urine volume, difficulty urinating, dysuria, flank pain, frequency, hematuria, pelvic pain and urgency. Musculoskeletal: Negative for arthralgias, back pain, joint swelling and myalgias. Skin: Negative for color change, pallor and rash. Neurological: Negative for dizziness, seizures, syncope, weakness, light-headedness, numbness and headaches. Hematological: Negative for adenopathy. Does not bruise/bleed easily. Psychiatric/Behavioral: Negative for agitation, behavioral problems and confusion. Physical Exam   Constitutional: She is oriented to person, place, and time. She appears well-developed and well-nourished. No distress. HENT:   Head: Normocephalic. Eyes: Pupils are equal, round, and reactive to light. No scleral icterus. Neck: Neck supple. No thyromegaly present. Cardiovascular: Normal rate and regular rhythm. No murmur heard. Pulmonary/Chest: Effort normal and breath sounds normal. No respiratory distress. She has no wheezes. Right breast exhibits no mass. Left breast exhibits no mass. Abdominal: Soft. She exhibits no mass. There is no hepatosplenomegaly. There is no tenderness. Musculoskeletal: Normal range of motion. She exhibits no edema or tenderness. Lymphadenopathy:     She has no cervical adenopathy. She has no axillary adenopathy. Neurological: She is alert and oriented to person, place, and time. No cranial nerve deficit. Skin: Skin is warm and dry. No cyanosis. Nails show no clubbing. Psychiatric: She has a normal mood and affect. Her behavior is normal. Thought content normal.   Nursing note and vitals reviewed. Results for orders placed or performed in visit on 05/18/19   Vitamin B12 & Folate   Result Value Ref Range    Vitamin B-12 688 180 - 914 pg/mL    Folate >25.0 >5.8 ng/mL   Comprehensive Metabolic Panel   Result Value Ref Range    Glucose 90 65 - 99 mg/dL    BUN 15 5 - 23 mg/dL    CREATININE 0.82 0.40 - 1.00 mg/dL    eGFR African American >60 >59 ml/min/1.73sq.m    EGFR IF NonAfrican American >60 >59 ml/min/1.73sq. m    Calcium 9.0 8.5 - 10.5 mg/dL    Sodium 140 134 - 146 mmol/L    Potassium 4.2 3.5 - 5.0 mmol/L    Chloride 106 98 - 109 mmol/L    CO2 27 22 - 32 mmol/L    Anion Gap 7 4 - 12 mmol/L    Total Bilirubin 1.1 0.3 - 1.2 mg/dL    Alk Phosphatase 59 39 - 130 U/L    AST 20 0 - 41 U/L    ALT 18 0 - 31 U/L    Total Protein 6.4 6.0 - 8.0 g/dL Alb 3.8 3.2 - 5.3 g/dL   CBC   Result Value Ref Range    WBC 5.1 3.7 - 10.8 K/ul    RBC 4.54 4.00 - 5.50 M/ul    Hemoglobin 14.2 12.0 - 16.0 g/dL    Hematocrit 41.2 36.0 - 48.0 %    MCV 90.7 80 - 100 fl    MCH 31.3 27.0 - 34.0 pg    MCHC 34.5 31.0 - 36.0 g/dl    RDW 12.3 10.8 - 14.8 %    Platelets 430 848 - 902 K/ul    Absolute Neut # 3.2 1.3 - 9.1 K/ul    Neutrophils % 63.0 %    Absolute Lymph # 1.3 0.8 - 5.2 K/ul    Lymphocyte % 24.9 %    Absolute Mono # 0.4 0.1 - 0.9 K/ul    Monocytes 8.1 %    Absolute Eos # 0.1 0.1 - 0.4 K/ul    Eosinophils % 2.6 %    Absolute Baso # 0.1 0.0 - 0.1 K/ul    Basophils % 1.2 %   Ferritin   Result Value Ref Range    Ferritin 27 11 - 307 ng/mL   Iron Binding Capacity   Result Value Ref Range    Iron, Serum 94 50 - 170 ug/dL    TIBC 291 250 - 425 ug/dL    Iron Saturation 32 15 - 50 % SATURATION   Soluble transferrin receptor   Result Value Ref Range    Soluble Transferrin Recept 3.0 1.9 - 4.4 mg/L       ASSESSMENT:      Diagnosis Orders   1. Iron deficiency anemia, unspecified iron deficiency anemia type  CBC Auto Differential    Comprehensive Metabolic Panel    Soluble transferrin receptor    Iron and TIBC    Ferritin    Vitamin B12 & Folate   2. Intestinal malabsorption, unspecified type       Iron deficiency anemia has improved after IV iron. I have advised her to continue taking the oral iron for now and will reevaluate her back again in 4 months. PLAN:      Return in about 4 months (around 9/30/2019) for iron deficiency anemia.     Orders Placed This Encounter   Procedures    CBC Auto Differential     Standing Status:   Future     Standing Expiration Date:   5/30/2020    Comprehensive Metabolic Panel     Standing Status:   Future     Standing Expiration Date:   5/30/2020    Soluble transferrin receptor     Standing Status:   Future     Standing Expiration Date:   5/30/2020    Iron and TIBC     Standing Status:   Future     Standing Expiration Date:   5/30/2020 Order Specific Question:   Is Patient Fasting? Answer:   No     Order Specific Question:   No of Hours? Answer:   No    Ferritin     Standing Status:   Future     Standing Expiration Date:   5/30/2020    Vitamin B12 & Folate     Standing Status:   Future     Standing Expiration Date:   5/30/2020     No orders of the defined types were placed in this encounter. Electronicallysigned by Brinda Sol MD on 5/30/2019 at 9:27 AM      If you have questions, please do not hesitate to call me. I look forward to following Yee Melvin along with you.     Sincerely,        Brinda Sol MD  Phone: [phone]

## 2019-05-30 NOTE — PROGRESS NOTES
thyromegaly present. Cardiovascular: Normal rate and regular rhythm. No murmur heard. Pulmonary/Chest: Effort normal and breath sounds normal. No respiratory distress. She has no wheezes. Right breast exhibits no mass. Left breast exhibits no mass. Abdominal: Soft. She exhibits no mass. There is no hepatosplenomegaly. There is no tenderness. Musculoskeletal: Normal range of motion. She exhibits no edema or tenderness. Lymphadenopathy:     She has no cervical adenopathy. She has no axillary adenopathy. Neurological: She is alert and oriented to person, place, and time. No cranial nerve deficit. Skin: Skin is warm and dry. No cyanosis. Nails show no clubbing. Psychiatric: She has a normal mood and affect. Her behavior is normal. Thought content normal.   Nursing note and vitals reviewed. Results for orders placed or performed in visit on 05/18/19   Vitamin B12 & Folate   Result Value Ref Range    Vitamin B-12 688 180 - 914 pg/mL    Folate >25.0 >5.8 ng/mL   Comprehensive Metabolic Panel   Result Value Ref Range    Glucose 90 65 - 99 mg/dL    BUN 15 5 - 23 mg/dL    CREATININE 0.82 0.40 - 1.00 mg/dL    eGFR African American >60 >59 ml/min/1.73sq.m    EGFR IF NonAfrican American >60 >59 ml/min/1.73sq. m    Calcium 9.0 8.5 - 10.5 mg/dL    Sodium 140 134 - 146 mmol/L    Potassium 4.2 3.5 - 5.0 mmol/L    Chloride 106 98 - 109 mmol/L    CO2 27 22 - 32 mmol/L    Anion Gap 7 4 - 12 mmol/L    Total Bilirubin 1.1 0.3 - 1.2 mg/dL    Alk Phosphatase 59 39 - 130 U/L    AST 20 0 - 41 U/L    ALT 18 0 - 31 U/L    Total Protein 6.4 6.0 - 8.0 g/dL    Alb 3.8 3.2 - 5.3 g/dL   CBC   Result Value Ref Range    WBC 5.1 3.7 - 10.8 K/ul    RBC 4.54 4.00 - 5.50 M/ul    Hemoglobin 14.2 12.0 - 16.0 g/dL    Hematocrit 41.2 36.0 - 48.0 %    MCV 90.7 80 - 100 fl    MCH 31.3 27.0 - 34.0 pg    MCHC 34.5 31.0 - 36.0 g/dl    RDW 12.3 10.8 - 14.8 %    Platelets 543 567 - 552 K/ul    Absolute Neut # 3.2 1.3 - 9.1 K/ul    Neutrophils % 63.0 %    Absolute Lymph # 1.3 0.8 - 5.2 K/ul    Lymphocyte % 24.9 %    Absolute Mono # 0.4 0.1 - 0.9 K/ul    Monocytes 8.1 %    Absolute Eos # 0.1 0.1 - 0.4 K/ul    Eosinophils % 2.6 %    Absolute Baso # 0.1 0.0 - 0.1 K/ul    Basophils % 1.2 %   Ferritin   Result Value Ref Range    Ferritin 27 11 - 307 ng/mL   Iron Binding Capacity   Result Value Ref Range    Iron, Serum 94 50 - 170 ug/dL    TIBC 291 250 - 425 ug/dL    Iron Saturation 32 15 - 50 % SATURATION   Soluble transferrin receptor   Result Value Ref Range    Soluble Transferrin Recept 3.0 1.9 - 4.4 mg/L       ASSESSMENT:      Diagnosis Orders   1. Iron deficiency anemia, unspecified iron deficiency anemia type  CBC Auto Differential    Comprehensive Metabolic Panel    Soluble transferrin receptor    Iron and TIBC    Ferritin    Vitamin B12 & Folate   2. Intestinal malabsorption, unspecified type       Iron deficiency anemia has improved after IV iron. I have advised her to continue taking the oral iron for now and will reevaluate her back again in 4 months. PLAN:      Return in about 4 months (around 9/30/2019) for iron deficiency anemia. Orders Placed This Encounter   Procedures    CBC Auto Differential     Standing Status:   Future     Standing Expiration Date:   5/30/2020    Comprehensive Metabolic Panel     Standing Status:   Future     Standing Expiration Date:   5/30/2020    Soluble transferrin receptor     Standing Status:   Future     Standing Expiration Date:   5/30/2020    Iron and TIBC     Standing Status:   Future     Standing Expiration Date:   5/30/2020     Order Specific Question:   Is Patient Fasting? Answer:   No     Order Specific Question:   No of Hours? Answer:   No    Ferritin     Standing Status:   Future     Standing Expiration Date:   5/30/2020    Vitamin B12 & Folate     Standing Status:   Future     Standing Expiration Date:   5/30/2020     No orders of the defined types were placed in this encounter. Electronicallysigned by Greg Hightower MD on 5/30/2019 at 9:27 AM

## 2019-09-15 LAB
ABSOLUTE BASO #: 0.1 X10E9/L (ref 0–0.9)
ABSOLUTE EOS #: 0.1 X10E9/L (ref 0–0.4)
ABSOLUTE LYMPH #: 1.5 X10E9/L (ref 1–3.5)
ABSOLUTE MONO #: 0.3 X10E9/L (ref 0–0.9)
ABSOLUTE NEUT #: 2.4 X10E9/L (ref 1.5–6.6)
ALBUMIN SERPL-MCNC: 4.3 G/DL (ref 3.2–5.3)
ALK PHOSPHATASE: 69 U/L (ref 39–130)
ALT SERPL-CCNC: 21 U/L (ref 0–31)
ANION GAP SERPL CALCULATED.3IONS-SCNC: 7 MMOL/L (ref 4–12)
AST SERPL-CCNC: 20 U/L (ref 0–41)
BASOPHILS RELATIVE PERCENT: 2 %
BILIRUB SERPL-MCNC: 0.8 MG/DL (ref 0.3–1.2)
BUN BLDV-MCNC: 16 MG/DL (ref 5–23)
CALCIUM SERPL-MCNC: 9.7 MG/DL (ref 8.5–10.5)
CHLORIDE BLD-SCNC: 107 MMOL/L (ref 98–109)
CO2: 28 MMOL/L (ref 22–32)
CREAT SERPL-MCNC: 0.86 MG/DL (ref 0.4–1)
EGFR AFRICAN AMERICAN: >60 ML/MIN/1.73SQ.M
EGFR IF NONAFRICAN AMERICAN: >60 ML/MIN/1.73SQ.M
EOSINOPHILS RELATIVE PERCENT: 3.4 %
FERRITIN: 37 NG/ML (ref 11–307)
FOLATE: 22.7 NG/ML
GLUCOSE: 88 MG/DL (ref 65–99)
HCT VFR BLD CALC: 44.4 % (ref 35–47)
HEMOGLOBIN: 14.9 G/DL (ref 11.7–16)
IRON SATURATION: 24 % SATURATION (ref 15–50)
IRON, SERUM: 75 UG/DL (ref 50–170)
LYMPHOCYTE %: 34 %
MCH RBC QN AUTO: 30.6 PG (ref 26–33.5)
MCHC RBC AUTO-ENTMCNC: 33.6 G/DL (ref 32–36)
MCV RBC AUTO: 91 FL (ref 81–100)
MONOCYTES # BLD: 7.3 %
NEUTROPHILS RELATIVE PERCENT: 53.3 %
PDW BLD-RTO: 13.3 % (ref 11.5–14.7)
PLATELETS: 160 X10E9/L (ref 150–450)
PMV BLD AUTO: 9.1 FL (ref 7–12)
POTASSIUM SERPL-SCNC: 4.5 MMOL/L (ref 3.5–5)
RBC: 4.88 X10E12/L (ref 3.8–5.2)
SODIUM BLD-SCNC: 142 MMOL/L (ref 134–146)
TOTAL IRON BINDING CAPACITY: 315 UG/DL (ref 250–425)
TOTAL PROTEIN: 7.4 G/DL (ref 6–8)
VITAMIN B-12: 706 PG/ML (ref 180–914)
WBC: 4.4 X10E9/L (ref 4.8–10.8)

## 2019-09-17 LAB — SOLUBLE TRANSFERRIN RECEPT: 3.3 MG/L (ref 1.9–4.4)

## 2019-09-30 ENCOUNTER — OFFICE VISIT (OUTPATIENT)
Dept: ONCOLOGY | Age: 54
End: 2019-09-30
Payer: COMMERCIAL

## 2019-09-30 VITALS
HEART RATE: 75 BPM | WEIGHT: 181.7 LBS | RESPIRATION RATE: 18 BRPM | TEMPERATURE: 98.3 F | BODY MASS INDEX: 30.27 KG/M2 | SYSTOLIC BLOOD PRESSURE: 115 MMHG | HEIGHT: 65 IN | DIASTOLIC BLOOD PRESSURE: 70 MMHG

## 2019-09-30 DIAGNOSIS — D50.9 IRON DEFICIENCY ANEMIA, UNSPECIFIED IRON DEFICIENCY ANEMIA TYPE: Primary | ICD-10-CM

## 2019-09-30 DIAGNOSIS — K90.9 INTESTINAL MALABSORPTION, UNSPECIFIED TYPE: ICD-10-CM

## 2019-09-30 PROCEDURE — 1036F TOBACCO NON-USER: CPT | Performed by: INTERNAL MEDICINE

## 2019-09-30 PROCEDURE — 3017F COLORECTAL CA SCREEN DOC REV: CPT | Performed by: INTERNAL MEDICINE

## 2019-09-30 PROCEDURE — G8427 DOCREV CUR MEDS BY ELIG CLIN: HCPCS | Performed by: INTERNAL MEDICINE

## 2019-09-30 PROCEDURE — 99214 OFFICE O/P EST MOD 30 MIN: CPT | Performed by: INTERNAL MEDICINE

## 2019-09-30 PROCEDURE — G8417 CALC BMI ABV UP PARAM F/U: HCPCS | Performed by: INTERNAL MEDICINE

## 2019-09-30 ASSESSMENT — ENCOUNTER SYMPTOMS
CONSTIPATION: 0
EYE ITCHING: 0
BLOOD IN STOOL: 0
ABDOMINAL PAIN: 0
COUGH: 0
SHORTNESS OF BREATH: 0
COLOR CHANGE: 0
NAUSEA: 0
VOMITING: 0
CHEST TIGHTNESS: 0
DIARRHEA: 0
EYE REDNESS: 0
BACK PAIN: 0
WHEEZING: 0

## 2019-09-30 NOTE — PROGRESS NOTES
murmur heard. Pulmonary/Chest: Effort normal and breath sounds normal. No respiratory distress. She has no wheezes. Right breast exhibits no mass. Left breast exhibits no mass. Abdominal: Soft. She exhibits no mass. There is no hepatosplenomegaly. There is no tenderness. Musculoskeletal: Normal range of motion. She exhibits no edema or tenderness. Lymphadenopathy:     She has no cervical adenopathy. She has no axillary adenopathy. Neurological: She is alert and oriented to person, place, and time. No cranial nerve deficit. Skin: Skin is warm and dry. No cyanosis. Nails show no clubbing. Psychiatric: She has a normal mood and affect. Her behavior is normal. Thought content normal.   Nursing note and vitals reviewed. Results for orders placed or performed in visit on 09/14/19   Vitamin B12 & Folate   Result Value Ref Range    Vitamin B-12 706 180 - 914 pg/mL    Folate 22.7 >5.8 ng/mL   Comprehensive Metabolic Panel   Result Value Ref Range    Glucose 88 65 - 99 mg/dL    BUN 16 5 - 23 mg/dL    CREATININE 0.86 0.40 - 1.00 mg/dL    eGFR African American >60 >59 ml/min/1.73sq.m    EGFR IF NonAfrican American >60 >59 ml/min/1.73sq. m    Calcium 9.7 8.5 - 10.5 mg/dL    Sodium 142 134 - 146 mmol/L    Potassium 4.5 3.5 - 5.0 mmol/L    Chloride 107 98 - 109 mmol/L    CO2 28 22 - 32 mmol/L    Anion Gap 7 4 - 12 mmol/L    Total Bilirubin 0.8 0.3 - 1.2 mg/dL    Alk Phosphatase 69 39 - 130 U/L    AST 20 0 - 41 U/L    ALT 21 0 - 31 U/L    Total Protein 7.4 6.0 - 8.0 g/dL    Alb 4.3 3.2 - 5.3 g/dL   CBC   Result Value Ref Range    WBC 4.4 (L) 4.8 - 10.8 X10E9/L    RBC 4.88 3.80 - 5.20 X10E12/L    Hemoglobin 14.9 11.7 - 16.0 g/dL    Hematocrit 44.4 35 - 47 %    MCV 91 81 - 100 fL    MCH 30.6 26 - 33.5 pg    MCHC 33.6 32 - 36 g/dL    RDW 13.3 11.5 - 14.7 %    Platelets 687 401 - 266 X10E9/L    MPV 9.1 7 - 12 fL    Neutrophils % 53.3 %    Absolute Neut # 2.4 1.5 - 6.6 X10E9/L    Lymphocyte % 34.0 %    Absolute Lymph Daryn Bro MD on 9/30/2019 at 3:18 PM

## 2019-12-17 ENCOUNTER — OFFICE VISIT (OUTPATIENT)
Dept: OBGYN | Age: 54
End: 2019-12-17
Payer: COMMERCIAL

## 2019-12-17 ENCOUNTER — HOSPITAL ENCOUNTER (OUTPATIENT)
Age: 54
Setting detail: SPECIMEN
Discharge: HOME | End: 2019-12-17
Payer: COMMERCIAL

## 2019-12-17 VITALS
HEIGHT: 65 IN | SYSTOLIC BLOOD PRESSURE: 130 MMHG | DIASTOLIC BLOOD PRESSURE: 74 MMHG | BODY MASS INDEX: 30.75 KG/M2 | WEIGHT: 184.6 LBS

## 2019-12-17 DIAGNOSIS — Z01.419 ENCOUNTER FOR ANNUAL ROUTINE GYNECOLOGICAL EXAMINATION: ICD-10-CM

## 2019-12-17 DIAGNOSIS — Z01.419 ENCOUNTER FOR ANNUAL ROUTINE GYNECOLOGICAL EXAMINATION: Primary | ICD-10-CM

## 2019-12-17 PROCEDURE — G8484 FLU IMMUNIZE NO ADMIN: HCPCS | Performed by: ADVANCED PRACTICE MIDWIFE

## 2019-12-17 PROCEDURE — G0145 SCR C/V CYTO,THINLAYER,RESCR: HCPCS

## 2019-12-17 PROCEDURE — 99396 PREV VISIT EST AGE 40-64: CPT | Performed by: ADVANCED PRACTICE MIDWIFE

## 2019-12-17 ASSESSMENT — PATIENT HEALTH QUESTIONNAIRE - PHQ9
2. FEELING DOWN, DEPRESSED OR HOPELESS: 0
SUM OF ALL RESPONSES TO PHQ QUESTIONS 1-9: 0
1. LITTLE INTEREST OR PLEASURE IN DOING THINGS: 0
SUM OF ALL RESPONSES TO PHQ9 QUESTIONS 1 & 2: 0
SUM OF ALL RESPONSES TO PHQ QUESTIONS 1-9: 0

## 2019-12-18 ASSESSMENT — ENCOUNTER SYMPTOMS
BACK PAIN: 0
SHORTNESS OF BREATH: 0
ABDOMINAL PAIN: 0

## 2019-12-27 LAB — CYTOLOGY REPORT: NORMAL

## 2020-01-26 LAB
ABSOLUTE BASO #: 0 X10E9/L (ref 0–0.9)
ABSOLUTE EOS #: 0.1 X10E9/L (ref 0–0.4)
ABSOLUTE LYMPH #: 1.5 X10E9/L (ref 1–3.5)
ABSOLUTE MONO #: 0.3 X10E9/L (ref 0–0.9)
ABSOLUTE NEUT #: 1.9 X10E9/L (ref 1.5–6.6)
ALBUMIN SERPL-MCNC: 4.2 G/DL (ref 3.2–5.3)
ALK PHOSPHATASE: 73 U/L (ref 39–130)
ALT SERPL-CCNC: 33 U/L (ref 0–31)
ANION GAP SERPL CALCULATED.3IONS-SCNC: 8 MMOL/L (ref 4–12)
AST SERPL-CCNC: 27 U/L (ref 0–41)
BASOPHILS RELATIVE PERCENT: 0.3 %
BILIRUB SERPL-MCNC: 0.8 MG/DL (ref 0.3–1.2)
BUN BLDV-MCNC: 16 MG/DL (ref 5–23)
CALCIUM SERPL-MCNC: 9.7 MG/DL (ref 8.5–10.5)
CHLORIDE BLD-SCNC: 105 MMOL/L (ref 98–109)
CO2: 29 MMOL/L (ref 22–32)
CREAT SERPL-MCNC: 0.82 MG/DL (ref 0.4–1)
EGFR AFRICAN AMERICAN: >60 ML/MIN/1.73SQ.M
EGFR IF NONAFRICAN AMERICAN: >60 ML/MIN/1.73SQ.M
EOSINOPHILS RELATIVE PERCENT: 3.5 %
FERRITIN: 59 NG/ML (ref 11–307)
FOLATE: >25 NG/ML
GLUCOSE: 78 MG/DL (ref 65–99)
HCT VFR BLD CALC: 43.5 % (ref 35–47)
HEMOGLOBIN: 14.9 G/DL (ref 11.7–16)
IRON SATURATION: 24 % SATURATION (ref 15–50)
IRON, SERUM: 75 UG/DL (ref 50–170)
LYMPHOCYTE %: 38.3 %
MCH RBC QN AUTO: 31.8 PG (ref 26–33.5)
MCHC RBC AUTO-ENTMCNC: 34.2 G/DL (ref 32–36)
MCV RBC AUTO: 93 FL (ref 81–100)
MONOCYTES # BLD: 7.6 %
NEUTROPHILS RELATIVE PERCENT: 50.3 %
PDW BLD-RTO: 13.1 % (ref 11.5–14.7)
PLATELETS: 138 X10E9/L (ref 150–450)
PMV BLD AUTO: 8.5 FL (ref 7–12)
POTASSIUM SERPL-SCNC: 4.2 MMOL/L (ref 3.5–5)
RBC: 4.68 X10E12/L (ref 3.8–5.2)
SODIUM BLD-SCNC: 142 MMOL/L (ref 134–146)
SOLUBLE TRANSFERRIN RECEPT: 3 MG/L (ref 1.9–4.4)
TOTAL IRON BINDING CAPACITY: 314 UG/DL (ref 250–425)
TOTAL PROTEIN: 7.4 G/DL (ref 6–8)
VITAMIN B-12: 844 PG/ML (ref 180–914)
WBC: 3.8 X10E9/L (ref 4.8–10.8)

## 2020-02-03 ENCOUNTER — OFFICE VISIT (OUTPATIENT)
Dept: ONCOLOGY | Age: 55
End: 2020-02-03
Payer: COMMERCIAL

## 2020-02-03 VITALS
RESPIRATION RATE: 16 BRPM | DIASTOLIC BLOOD PRESSURE: 80 MMHG | HEART RATE: 70 BPM | SYSTOLIC BLOOD PRESSURE: 131 MMHG | BODY MASS INDEX: 31.45 KG/M2 | TEMPERATURE: 98 F | WEIGHT: 189 LBS

## 2020-02-03 PROCEDURE — G8427 DOCREV CUR MEDS BY ELIG CLIN: HCPCS | Performed by: INTERNAL MEDICINE

## 2020-02-03 PROCEDURE — G8417 CALC BMI ABV UP PARAM F/U: HCPCS | Performed by: INTERNAL MEDICINE

## 2020-02-03 PROCEDURE — 99213 OFFICE O/P EST LOW 20 MIN: CPT | Performed by: INTERNAL MEDICINE

## 2020-02-03 PROCEDURE — 3017F COLORECTAL CA SCREEN DOC REV: CPT | Performed by: INTERNAL MEDICINE

## 2020-02-03 PROCEDURE — 1036F TOBACCO NON-USER: CPT | Performed by: INTERNAL MEDICINE

## 2020-02-03 PROCEDURE — G8484 FLU IMMUNIZE NO ADMIN: HCPCS | Performed by: INTERNAL MEDICINE

## 2020-02-03 ASSESSMENT — ENCOUNTER SYMPTOMS
EYE REDNESS: 0
CONSTIPATION: 0
VOMITING: 0
COUGH: 0
ABDOMINAL PAIN: 0
NAUSEA: 0
EYE ITCHING: 0
WHEEZING: 0
CHEST TIGHTNESS: 0
BACK PAIN: 0
COLOR CHANGE: 0
BLOOD IN STOOL: 0
SHORTNESS OF BREATH: 0
DIARRHEA: 0

## 2020-02-03 NOTE — LETTER
2/3/2020     JUSTIN Hyatt - CNP   [address]    Dear Dr. Penny Acevedo, JUSTIN - CNP, and Dr. Surekha Peterson ref. provider found: Thank you for referring Luis Alfredo Osorio, 1965, to me for evaluation. Below are the relevant portions of my assessment and plan of care. [facility] [address] [facility]  [address] [place]  [place] [address] [facility]  Dept: [phone]  Dept Fax: [phone]  Won Dudley is a 54 y.o. female who presentstoday for follow up of her   Chief Complaint   Patient presents with   Danie Meza is a 54 year-old lady who On routine blood work  was found to be severely iron deficient and her hemoglobin was in the 10 g range. She was prescribed iron once a day With very slight change in her iron stores. . Serum ferritin was still 6.9, iron saturation was  10 and TIBC was 407. Canda Nay She still has her menstrual periods but claims that they are normal and not excessively heavy. She had a colonoscopy done in April which was negative. Her last EGD was in 2013 which was also negative. Her main complaint is of heartburn or acid reflux. She received a course of IV iron  and her hemoglobin is now 14.9 g . She states that she is feeling good. She is not  taking oral iron now. Current Outpatient Medications   Medication Sig Dispense Refill    NONFORMULARY Mellaluca Digestive enzyme, one daily      vitamin C (ASCORBIC ACID) 500 MG tablet Take 500 mg by mouth daily      MULTIPLE VITAMIN PO Take 2 tablets by mouth daily      Prenatal Vit-Iron Carbonyl-FA (PRENATAL PLUS IRON PO) Take by mouth daily OTC        No current facility-administered medications for this visit.         Allergies   Allergen Reactions    Penicillins     Vancomycin     Wellbutrin [Bupropion]        Past Medical History:   Diagnosis Date    Iron deficiency anemia     Ovarian cyst         Past Surgical History: Procedure Laterality Date     SECTION      x2    COLONOSCOPY      2005    CYST REMOVAL Right     ovary    [place] COLON [place] SCRN NOT  [address] IND N/A 2017    -normal    UPPER GASTROINTESTINAL ENDOSCOPY  12-10-13       Family History   Problem Relation Age of Onset    High Blood Pressure Mother     High Blood Pressure Father     Other Father     Kidney Disease Maternal Grandmother     Stroke Maternal Grandfather     Heart Disease Maternal Grandfather     Cancer Paternal Grandmother     Heart Disease Paternal Grandfather     Other Other         Pat. aunt breast cancer . No family h/o ovarian cancer or DVT. Social History     Tobacco Use    Smoking status: Never Smoker    Smokeless tobacco: Never Used   Substance Use Topics    Alcohol use: Yes     Comment: Occ          The Past MedicalHistory, Past Surgical History, Past Family History and Past Social History havebeen reviewed      Review of Systems   Constitutional: Negative for activity change, appetite change, chills, diaphoresis, fatigue, fever and unexpected weight change. HENT: Negative for congestion, hearing loss, mouth sores and nosebleeds. Eyes: Negative for redness, itching and visual disturbance. Respiratory: Negative for cough, chest tightness, shortness of breath and wheezing. Cardiovascular: Negative for chest pain, palpitations and leg swelling. Gastrointestinal: Negative for abdominal pain, blood in stool, constipation, diarrhea, nausea and vomiting. Genitourinary: Negative for decreased urine volume, difficulty urinating, dysuria, flank pain, frequency, hematuria, pelvic pain and urgency. Musculoskeletal: Negative for arthralgias, back pain, joint swelling and myalgias. Skin: Negative for color change, pallor and rash. Neurological: Negative for dizziness, seizures, syncope, weakness, light-headedness, numbness and headaches. Hematological: Negative for adenopathy. Does not bruise/bleed easily. Psychiatric/Behavioral: Negative for agitation, behavioral problems and confusion. Physical Exam  Vitals signs and nursing note reviewed. Constitutional:       General: She is not in acute distress. Appearance: She is well-developed. HENT:      Head: Normocephalic. Eyes:      General: No scleral icterus. Pupils: Pupils are equal, round, and reactive to light. Neck:      Musculoskeletal: Neck supple. Thyroid: No thyromegaly. Cardiovascular:      Rate and Rhythm: Normal rate and regular rhythm. Heart sounds: No murmur. Pulmonary:      Effort: Pulmonary effort is normal. No respiratory distress. Breath sounds: Normal breath sounds. No wheezing. Chest:      Breasts:         Right: No mass. Left: No mass. Abdominal:      Palpations: Abdomen is soft. There is no mass. Tenderness: There is no abdominal tenderness. Musculoskeletal: Normal range of motion. General: No tenderness. Lymphadenopathy:      Cervical: No cervical adenopathy. Skin:     General: Skin is warm and dry. Nails: There is no clubbing. Neurological:      Mental Status: She is alert and oriented to person, place, and time. Cranial Nerves: No cranial nerve deficit. Psychiatric:         Behavior: Behavior normal.         Thought Content:  Thought content normal.       Results for orders placed or performed in visit on 01/25/20   CBC   Result Value Ref Range    WBC 3.8 (L) 4.8 - 10.8 X10E9/L    RBC 4.68 3.80 - 5.20 X10E12/L    Hemoglobin 14.9 11.7 - 16.0 g/dL    Hematocrit 43.5 35 - 47 %    MCV 93 81 - 100 fL    MCH 31.8 26 - 33.5 pg    MCHC 34.2 32 - 36 g/dL    RDW 13.1 11.5 - 14.7 %    Platelets 627 (L) 199 - 450 X10E9/L    MPV 8.5 7 - 12 fL    Neutrophils % 50.3 %    Absolute Neut # 1.9 1.5 - 6.6 X10E9/L    Lymphocyte % 38.3 %    Absolute Lymph # 1.5 1.0 - 3.5 X10E9/L    Monocytes 7.6 % Absolute Mono # 0.3 0 - 0.9 X10E9/L    Eosinophils % 3.5 %    Absolute Eos # 0.1 0.0 - 0.4 X10E9/L    Basophils % 0.3 %    Absolute Baso # 0.0 0 - 0.9 X10E9/L   Comprehensive Metabolic Panel   Result Value Ref Range    Glucose 78 65 - 99 mg/dL    BUN 16 5 - 23 mg/dL    CREATININE 0.82 0.40 - 1.00 mg/dL    eGFR African American >60 >59 ml/min/1.73sq.m    EGFR IF NonAfrican American >60 >59 ml/min/1.73sq. m    Calcium 9.7 8.5 - 10.5 mg/dL    Sodium 142 134 - 146 mmol/L    Potassium 4.2 3.5 - 5.0 mmol/L    Chloride 105 98 - 109 mmol/L    CO2 29 22 - 32 mmol/L    Anion Gap 8 4 - 12 mmol/L    Total Bilirubin 0.8 0.3 - 1.2 mg/dL    Alk Phosphatase 73 39 - 130 U/L    AST 27 0 - 41 U/L    ALT 33 (H) 0 - 31 U/L    Total Protein 7.4 6.0 - 8.0 g/dL    Alb 4.2 3.2 - 5.3 g/dL   Soluble transferrin receptor   Result Value Ref Range    Soluble Transferrin Recept 3.0 1.9 - 4.4 mg/L   Ferritin   Result Value Ref Range    Ferritin 59 11 - 307 ng/mL   Iron Binding Capacity   Result Value Ref Range    Iron, Serum 75 50 - 170 ug/dL    TIBC 314 250 - 425 ug/dL    Iron Saturation 24 15 - 50 % SATURATION   Vitamin B12 & Folate   Result Value Ref Range    Vitamin B-12 844 180 - 914 pg/mL    Folate >25.0 >5.8 ng/mL       ASSESSMENT:      Diagnosis Orders   1. Intestinal malabsorption, unspecified type     2. Iron deficiency anemia, unspecified iron deficiency anemia type  CBC Auto Differential    Soluble transferrin receptor    Comprehensive Metabolic Panel    Ferritin    Iron and TIBC    Vitamin B12 & Folate     Iron deficiency anemia has improved after IV iron. I have advised her to continue taking multivitamins with iron for now and will reevaluate her back again in 4 months. PLAN:      Return in about 4 months (around 6/3/2020) for iron deficiency anemia.     Orders Placed This Encounter   Procedures    CBC Auto Differential     Standing Status:   Future     Standing Expiration Date:   2/3/2021    Soluble transferrin receptor

## 2020-02-03 NOTE — PROGRESS NOTES
[facility] [address] [facility]  [address]  [place] [address] [facility]  Dept: [phone]  Dept Fax: [phone]  Brett Dewitt is a 54 y.o. female who presentstoday for follow up of her   Chief Complaint   Patient presents with   Juhi Rodríguez is a 54 year-old lady who On routine blood work  was found to be severely iron deficient and her hemoglobin was in the 10 g range. She was prescribed iron once a day With very slight change in her iron stores. . Serum ferritin was still 6.9, iron saturation was  10 and TIBC was 407. Sylvester Maher She still has her menstrual periods but claims that they are normal and not excessively heavy. She had a colonoscopy done in April which was negative. Her last EGD was in  which was also negative. Her main complaint is of heartburn or acid reflux. She received a course of IV iron  and her hemoglobin is now 14.9 g . She states that she is feeling good. She is not  taking oral iron now. Current Outpatient Medications   Medication Sig Dispense Refill    NONFORMULARY Mellaluca Digestive enzyme, one daily      vitamin C (ASCORBIC ACID) 500 MG tablet Take 500 mg by mouth daily      MULTIPLE VITAMIN PO Take 2 tablets by mouth daily      Prenatal Vit-Iron Carbonyl-FA (PRENATAL PLUS IRON PO) Take by mouth daily OTC        No current facility-administered medications for this visit.         Allergies   Allergen Reactions    Penicillins     Vancomycin     Wellbutrin [Bupropion]        Past Medical History:   Diagnosis Date    Iron deficiency anemia     Ovarian cyst         Past Surgical History:   Procedure Laterality Date     SECTION      x2    COLONOSCOPY      2005    CYST REMOVAL Right     ovary    [place] COLON CA SCRN NOT HI RSK IND N/A 2017    -normal    UPPER GASTROINTESTINAL ENDOSCOPY  12-10-13       Family History   Problem Relation Age of Onset    High Blood Pressure Mother    Aaron Rodriguez icterus. Pupils: Pupils are equal, round, and reactive to light. Neck:      Musculoskeletal: Neck supple. Thyroid: No thyromegaly. Cardiovascular:      Rate and Rhythm: Normal rate and regular rhythm. Heart sounds: No murmur. Pulmonary:      Effort: Pulmonary effort is normal. No respiratory distress. Breath sounds: Normal breath sounds. No wheezing. Chest:      Breasts:         Right: No mass. Left: No mass. Abdominal:      Palpations: Abdomen is soft. There is no mass. Tenderness: There is no abdominal tenderness. Musculoskeletal: Normal range of motion. General: No tenderness. Lymphadenopathy:      Cervical: No cervical adenopathy. Skin:     General: Skin is warm and dry. Nails: There is no clubbing. Neurological:      Mental Status: She is alert and oriented to person, place, and time. Cranial Nerves: No cranial nerve deficit. Psychiatric:         Behavior: Behavior normal.         Thought Content:  Thought content normal.       Results for orders placed or performed in visit on 01/25/20   CBC   Result Value Ref Range    WBC 3.8 (L) 4.8 - 10.8 X10E9/L    RBC 4.68 3.80 - 5.20 X10E12/L    Hemoglobin 14.9 11.7 - 16.0 g/dL    Hematocrit 43.5 35 - 47 %    MCV 93 81 - 100 fL    MCH 31.8 26 - 33.5 pg    MCHC 34.2 32 - 36 g/dL    RDW 13.1 11.5 - 14.7 %    Platelets 177 (L) 694 - 450 X10E9/L    MPV 8.5 7 - 12 fL    Neutrophils % 50.3 %    Absolute Neut # 1.9 1.5 - 6.6 X10E9/L    Lymphocyte % 38.3 %    Absolute Lymph # 1.5 1.0 - 3.5 X10E9/L    Monocytes 7.6 %    Absolute Mono # 0.3 0 - 0.9 X10E9/L    Eosinophils % 3.5 %    Absolute Eos # 0.1 0.0 - 0.4 X10E9/L    Basophils % 0.3 %    Absolute Baso # 0.0 0 - 0.9 X10E9/L   Comprehensive Metabolic Panel   Result Value Ref Range    Glucose 78 65 - 99 mg/dL    BUN 16 5 - 23 mg/dL    CREATININE 0.82 0.40 - 1.00 mg/dL    eGFR African American >60 >59 ml/min/1.73sq.m    EGFR IF NonAfrican American >60 >59

## 2020-02-14 ENCOUNTER — HOSPITAL ENCOUNTER (OUTPATIENT)
Dept: WOMENS IMAGING | Age: 55
Discharge: HOME | End: 2020-02-16
Payer: COMMERCIAL

## 2020-02-14 PROCEDURE — 77063 BREAST TOMOSYNTHESIS BI: CPT

## 2020-11-16 ENCOUNTER — OFFICE VISIT (OUTPATIENT)
Dept: PRIMARY CARE CLINIC | Age: 55
End: 2020-11-16
Payer: COMMERCIAL

## 2020-11-16 VITALS
HEART RATE: 74 BPM | RESPIRATION RATE: 20 BRPM | WEIGHT: 190 LBS | TEMPERATURE: 98.5 F | SYSTOLIC BLOOD PRESSURE: 132 MMHG | DIASTOLIC BLOOD PRESSURE: 80 MMHG | BODY MASS INDEX: 31.62 KG/M2

## 2020-11-16 PROCEDURE — G8484 FLU IMMUNIZE NO ADMIN: HCPCS | Performed by: NURSE PRACTITIONER

## 2020-11-16 PROCEDURE — G8427 DOCREV CUR MEDS BY ELIG CLIN: HCPCS | Performed by: NURSE PRACTITIONER

## 2020-11-16 PROCEDURE — G8417 CALC BMI ABV UP PARAM F/U: HCPCS | Performed by: NURSE PRACTITIONER

## 2020-11-16 PROCEDURE — 1036F TOBACCO NON-USER: CPT | Performed by: NURSE PRACTITIONER

## 2020-11-16 PROCEDURE — 99214 OFFICE O/P EST MOD 30 MIN: CPT | Performed by: NURSE PRACTITIONER

## 2020-11-16 PROCEDURE — 3017F COLORECTAL CA SCREEN DOC REV: CPT | Performed by: NURSE PRACTITIONER

## 2020-11-16 SDOH — ECONOMIC STABILITY: FOOD INSECURITY: WITHIN THE PAST 12 MONTHS, YOU WORRIED THAT YOUR FOOD WOULD RUN OUT BEFORE YOU GOT MONEY TO BUY MORE.: NEVER TRUE

## 2020-11-16 SDOH — ECONOMIC STABILITY: TRANSPORTATION INSECURITY
IN THE PAST 12 MONTHS, HAS THE LACK OF TRANSPORTATION KEPT YOU FROM MEDICAL APPOINTMENTS OR FROM GETTING MEDICATIONS?: NO

## 2020-11-16 SDOH — ECONOMIC STABILITY: TRANSPORTATION INSECURITY
IN THE PAST 12 MONTHS, HAS LACK OF TRANSPORTATION KEPT YOU FROM MEETINGS, WORK, OR FROM GETTING THINGS NEEDED FOR DAILY LIVING?: NO

## 2020-11-16 SDOH — ECONOMIC STABILITY: INCOME INSECURITY: HOW HARD IS IT FOR YOU TO PAY FOR THE VERY BASICS LIKE FOOD, HOUSING, MEDICAL CARE, AND HEATING?: NOT HARD AT ALL

## 2020-11-16 SDOH — ECONOMIC STABILITY: FOOD INSECURITY: WITHIN THE PAST 12 MONTHS, THE FOOD YOU BOUGHT JUST DIDN'T LAST AND YOU DIDN'T HAVE MONEY TO GET MORE.: NEVER TRUE

## 2020-11-16 ASSESSMENT — ENCOUNTER SYMPTOMS
VOMITING: 0
HEARTBURN: 0
SORE THROAT: 0
RHINORRHEA: 0
COUGH: 0
BELCHING: 0
WHEEZING: 0
NAUSEA: 0
SHORTNESS OF BREATH: 0
DIARRHEA: 0
ABDOMINAL PAIN: 0
TROUBLE SWALLOWING: 0
CONSTIPATION: 0

## 2020-11-16 ASSESSMENT — PATIENT HEALTH QUESTIONNAIRE - PHQ9
SUM OF ALL RESPONSES TO PHQ QUESTIONS 1-9: 0
SUM OF ALL RESPONSES TO PHQ QUESTIONS 1-9: 0
SUM OF ALL RESPONSES TO PHQ9 QUESTIONS 1 & 2: 0
2. FEELING DOWN, DEPRESSED OR HOPELESS: 0
1. LITTLE INTEREST OR PLEASURE IN DOING THINGS: 0
SUM OF ALL RESPONSES TO PHQ QUESTIONS 1-9: 0

## 2020-11-16 NOTE — PATIENT INSTRUCTIONS
with your doctor. · Change your eating habits. ? It's best to eat several small meals instead of two or three large meals. ? After you eat, wait 2 to 3 hours before you lie down. ? Chocolate, mint, and alcohol can make GERD worse. ? Spicy foods, foods that have a lot of acid (like tomatoes and oranges), and coffee can make GERD symptoms worse in some people. If your symptoms are worse after you eat a certain food, you may want to stop eating that food to see if your symptoms get better. · Do not smoke or chew tobacco. Smoking can make GERD worse. If you need help quitting, talk to your doctor about stop-smoking programs and medicines. These can increase your chances of quitting for good. · If you have GERD symptoms at night, raise the head of your bed 6 to 8 inches by putting the frame on blocks or placing a foam wedge under the head of your mattress. (Adding extra pillows does not work.)  · Do not wear tight clothing around your middle. · Lose weight if you need to. Losing just 5 to 10 pounds can help. When should you call for help? Call your doctor now or seek immediate medical care if:    · You have new or different belly pain.     · Your stools are black and tarlike or have streaks of blood. Watch closely for changes in your health, and be sure to contact your doctor if:    · Your symptoms have not improved after 2 days.     · Food seems to catch in your throat or chest.   Where can you learn more? Go to [email or web address]://[email or web address] org and sign in to your [employer] account. Enter E783 in the [facility] box to learn more about \"Gastroesophageal Reflux Disease (GERD): Care Instructions. \"     If you do not have an account, please click on the \"Sign Up Now\" link. Current as of: April 15, 2020               Content Version: 12.6  © 9464-5284 [employer]. Care instructions adapted under license by [facility] ([facility]).  If you have questions about a medical condition or this instruction, always ask your healthcare professional. [place] [address] any warranty or liability for your use of this information.

## 2020-11-16 NOTE — PROGRESS NOTES
Name: Luz Conn  : 1965         Chief Complaint:     Chief Complaint   Patient presents with    Gastroesophageal Reflux     routine check       History of Present Illness:      Luz Conn is a 54 y.o.  female who presents with Gastroesophageal Reflux (routine check)      Javier Paul is here today for a routine office visit. Fatigue/neck pain-patient complains of increased fatigue and some anterior neck pain in the area of the thyroid. Patient states this episodes of pain are sharp and fleeting. She does not really have dysphagia. She does have some pain with movement. She has known history of a thyroid cyst from previous ultrasound. Gastroesophageal Reflux   She reports no abdominal pain, no belching, no chest pain, no coughing, no heartburn, no nausea, no sore throat or no wheezing. This is a chronic problem. The current episode started 1 to 4 weeks ago. The problem occurs rarely. The problem has been unchanged. The heartburn does not wake her from sleep. The heartburn does not limit her activity. The heartburn doesn't change with position. The symptoms are aggravated by certain foods. Associated symptoms include anemia and fatigue. Pertinent negatives include no muscle weakness or orthopnea. Risk factors include caffeine use. She has tried an antacid (AS NEEDED) for the symptoms. The treatment provided significant relief. Past procedures include an EGD. Past procedures do not include a UGI. Past invasive treatments do not include gastroplasty, gastroplication or reflux surgery. Past Medical History:     Past Medical History:   Diagnosis Date    Iron deficiency anemia     Ovarian cyst       Reviewed all health maintenance requirements and ordered appropriate tests  There are no preventive care reminders to display for this patient.     Past Surgical History:     Past Surgical History:   Procedure Laterality Date     SECTION      x2    COLONOSCOPY          CYST REMOVAL Right     ovary    [place] COLON CA SCRN NOT HI RSK IND N/A 4/5/2017    -normal    UPPER GASTROINTESTINAL ENDOSCOPY  12-10-13        Medications:       Prior to Admission medications    Medication Sig Start Date End Date Taking? Authorizing Provider   MULTIPLE VITAMIN PO Take 2 tablets by mouth daily   Yes Historical Provider, MD   vitamin C (ASCORBIC ACID) 500 MG tablet Take 500 mg by mouth daily    Historical Provider, MD   Prenatal Vit-Iron Carbonyl-FA (PRENATAL PLUS IRON PO) Take by mouth daily OTC     Historical Provider, MD        Allergies:       Penicillins; Vancomycin; and Wellbutrin [bupropion]    Social History:     Tobacco:    reports that she has never smoked. She has never used smokeless tobacco.  Alcohol:      reports current alcohol use. Drug Use:  reports no history of drug use. Family History:     Family History   Problem Relation Age of Onset    High Blood Pressure Mother     High Blood Pressure Father     Other Father     Kidney Disease Maternal Grandmother     Stroke Maternal Grandfather     Heart Disease Maternal Grandfather     Cancer Paternal Grandmother     Heart Disease Paternal Grandfather     Other Other         Pat. aunt breast cancer . No family h/o ovarian cancer or DVT. Review of Systems:     Positive and Negative as described in HPI    Review of Systems   Constitutional: Positive for fatigue. Negative for chills and fever. HENT: Negative for congestion, rhinorrhea, sore throat and trouble swallowing. Eyes: Negative for visual disturbance. Respiratory: Negative for cough, shortness of breath and wheezing. Cardiovascular: Negative for chest pain and palpitations. Gastrointestinal: Negative for abdominal pain, constipation, diarrhea, heartburn, nausea and vomiting. Genitourinary: Negative for difficulty urinating and dysuria. Musculoskeletal: Positive for neck pain. Negative for gait problem, muscle weakness and neck stiffness.    Skin: Negative for rash. Neurological: Negative for dizziness, syncope, light-headedness and headaches. Physical Exam:   Vitals:  /80 (Site: Left Upper Arm)   Pulse 74   Temp 98.5 °F (36.9 °C) (Temporal)   Resp 20   Wt 190 lb (86.2 kg)   BMI 31.62 kg/m²     Physical Exam  Vitals signs and nursing note reviewed. Constitutional:       General: She is not in acute distress. Appearance: Normal appearance. She is well-developed. She is not ill-appearing. HENT:      Mouth/Throat:      Mouth: Mucous membranes are moist.   Eyes:      General: No scleral icterus. Conjunctiva/sclera: Conjunctivae normal.   Neck:      Musculoskeletal: Normal range of motion and neck supple. No neck rigidity or muscular tenderness. Cardiovascular:      Rate and Rhythm: Normal rate and regular rhythm. Heart sounds: Normal heart sounds. No murmur. Pulmonary:      Effort: Pulmonary effort is normal.      Breath sounds: Normal breath sounds. No wheezing. Abdominal:      General: Bowel sounds are normal. There is no distension. Palpations: Abdomen is soft. Tenderness: There is no abdominal tenderness. Musculoskeletal:      Right lower leg: No edema. Left lower leg: No edema. Lymphadenopathy:      Cervical: No cervical adenopathy. Skin:     General: Skin is warm and dry. Findings: No rash. Neurological:      Mental Status: She is alert and oriented to person, place, and time.    Psychiatric:         Mood and Affect: Mood normal.         Behavior: Behavior normal.         Data:     Lab Results   Component Value Date     01/25/2020    K 4.2 01/25/2020     01/25/2020    CO2 29 01/25/2020    BUN 16 01/25/2020    CREATININE 0.82 01/25/2020    GLUCOSE 78 01/25/2020    PROT 7.4 01/25/2020    LABALBU 4.2 01/25/2020    BILITOT 0.8 01/25/2020    BILITOT NEGATIVE 08/31/2017    ALKPHOS 73 01/25/2020    ALKPHOS 65 08/27/2013    AST 27 01/25/2020    ALT 33 01/25/2020     Lab Results Component Value Date    WBC 3.8 01/25/2020    WBC 6.9 02/25/2019    RBC 4.68 01/25/2020    HGB 14.9 01/25/2020    HCT 43.5 01/25/2020    MCV 93 01/25/2020    MCH 31.8 01/25/2020    MCHC 34.2 01/25/2020    RDW 13.1 01/25/2020     01/25/2020     02/25/2019    MPV 8.5 01/25/2020     Lab Results   Component Value Date    TSH 2.000 08/31/2017     Lab Results   Component Value Date    CHOL 157 03/17/2017    HDL 61 03/17/2017       Assessment/Plan:      Diagnosis Orders   1. Gastroesophageal reflux disease without esophagitis     2. Anterior neck pain  TSH without Reflex    T4, Free    US HEAD NECK SOFT TISSUE THYROID   3. Thyroid cyst  TSH without Reflex    T4, Free    US HEAD NECK SOFT TISSUE THYROID   4. Other fatigue  TSH without Reflex    T4, Free    US HEAD NECK SOFT TISSUE THYROID     She will continue her natural products for GERD. We will order thyroid ultrasound and labs to assess her difficulty with the pain and fatigue. 1.  Judi received counseling on the following healthy behaviors: nutrition, exercise and medication adherence  2. Patient given educational materials - see patient instructions  3. Was a self-tracking handout given in paper form or via [employer]? No  If yes, see orders or list here. 4.  Discussed use, benefit, and side effects of prescribed medications. Barriers to medication compliance addressed. All patient questions answered. Pt voiced understanding. 5.  Reviewed prior labs and health maintenance  6. Continue current medications, diet and exercise. Completed Refills   Requested Prescriptions      No prescriptions requested or ordered in this encounter         Return if symptoms worsen or fail to improve.

## 2020-11-17 ENCOUNTER — TELEPHONE (OUTPATIENT)
Dept: PRIMARY CARE CLINIC | Age: 55
End: 2020-11-17

## 2020-11-17 LAB
T4 FREE: 0.85 NG/DL (ref 0.61–1.6)
TSH SERPL DL<=0.05 MIU/L-ACNC: 2.72 UIU/ML (ref 0.49–4.67)

## 2020-11-17 NOTE — TELEPHONE ENCOUNTER
----- Message from JUSTIN Marcano CNP sent at 11/17/2020  7:58 AM EST -----  Results are normal, please call patient and make them aware. We're awaiting ultrasound results.

## 2020-11-18 ENCOUNTER — HOSPITAL ENCOUNTER (OUTPATIENT)
Dept: ULTRASOUND IMAGING | Age: 55
Discharge: HOME | End: 2020-11-20
Payer: COMMERCIAL

## 2020-11-18 ENCOUNTER — TELEPHONE (OUTPATIENT)
Dept: PRIMARY CARE CLINIC | Age: 55
End: 2020-11-18

## 2020-11-18 PROCEDURE — 76536 US EXAM OF HEAD AND NECK: CPT

## 2020-11-18 NOTE — TELEPHONE ENCOUNTER
Called patient and informed her that [facility]. Verbalizes understanding.

## 2020-11-18 NOTE — TELEPHONE ENCOUNTER
----- Message from [address] [place] [address], JUSTIN - CNP sent at 11/18/2020  3:39 PM EST -----  Results are normal, please call patient and make them aware.

## 2020-12-30 ENCOUNTER — OFFICE VISIT (OUTPATIENT)
Dept: OBGYN | Age: 55
End: 2020-12-30
Payer: COMMERCIAL

## 2020-12-30 ENCOUNTER — HOSPITAL ENCOUNTER (OUTPATIENT)
Age: 55
Setting detail: SPECIMEN
Discharge: HOME | End: 2020-12-30
Payer: COMMERCIAL

## 2020-12-30 VITALS
WEIGHT: 185 LBS | DIASTOLIC BLOOD PRESSURE: 80 MMHG | SYSTOLIC BLOOD PRESSURE: 125 MMHG | BODY MASS INDEX: 31.58 KG/M2 | HEIGHT: 64 IN

## 2020-12-30 PROCEDURE — G0145 SCR C/V CYTO,THINLAYER,RESCR: HCPCS

## 2020-12-30 PROCEDURE — 99396 PREV VISIT EST AGE 40-64: CPT | Performed by: ADVANCED PRACTICE MIDWIFE

## 2020-12-30 PROCEDURE — G8484 FLU IMMUNIZE NO ADMIN: HCPCS | Performed by: ADVANCED PRACTICE MIDWIFE

## 2020-12-30 ASSESSMENT — ENCOUNTER SYMPTOMS
BACK PAIN: 0
SHORTNESS OF BREATH: 0
ABDOMINAL PAIN: 0

## 2020-12-30 NOTE — PROGRESS NOTES
YEARLY PHYSICAL    Date of service: 2020    Angie Grayson  Is a 54 y.o.   female    PT's PCP is: [facility], JUSTIN - CNP     : 1965                                             Subjective:       Patient's last menstrual period was 2020. Are your menses regular: no    OB History    Para Term  AB Living   4       2 2   SAB TAB Ectopic Molar Multiple Live Births   2                # Outcome Date GA Lbr Roger/2nd Weight Sex Delivery Anes PTL Lv   4             3             2 SAB            1 SAB                 Social History     Tobacco Use   Smoking Status Never Smoker   Smokeless Tobacco Never Used        Social History     Substance and Sexual Activity   Alcohol Use Yes    Comment: Occ       Family History   Problem Relation Age of Onset    High Blood Pressure Mother     High Blood Pressure Father     Other Father     Kidney Disease Maternal Grandmother     Stroke Maternal Grandfather     Heart Disease Maternal Grandfather     Cancer Paternal Grandmother     Heart Disease Paternal Grandfather     Other Other         Pat. aunt breast cancer . No family h/o ovarian cancer or DVT.         Any family history of breast or ovarian cancer: yes p aunt breast cancer    Any family history of blood clots: No      Allergies: Penicillins, Vancomycin, and Wellbutrin [bupropion]      Current Outpatient Medications:     Calcium Carbonate Antacid (CALCIUM ANTACID PO), Take by mouth, Disp: , Rfl:     MULTIPLE VITAMIN PO, Take 2 tablets by mouth daily, Disp: , Rfl:     vitamin C (ASCORBIC ACID) 500 MG tablet, Take 500 mg by mouth daily, Disp: , Rfl:     Prenatal Vit-Iron Carbonyl-FA (PRENATAL PLUS IRON PO), Take by mouth daily OTC , Disp: , Rfl:     Social History     Substance and Sexual Activity   Sexual Activity Yes    Partners: Male       Any bleeding or pain with intercourse: No    Last Yearly:  19    Last pap: 19    Last HPV: 16    Last Mammogram: 2020    Last Opal Pale never    Last colorectal screen- type: colonoscopy  date  17    Do you do self breast exams: Yes    Past Medical History:   Diagnosis Date    Iron deficiency anemia     Ovarian cyst        Past Surgical History:   Procedure Laterality Date     SECTION      x2    COLONOSCOPY      2005    CYST REMOVAL Right     ovary    MD COLON CA SCRN NOT  [address] IND N/A 2017    -normal    UPPER GASTROINTESTINAL ENDOSCOPY  12-10-13       Family History   Problem Relation Age of Onset    High Blood Pressure Mother     High Blood Pressure Father     Other Father     Kidney Disease Maternal Grandmother     Stroke Maternal Grandfather     Heart Disease Maternal Grandfather     Cancer Paternal Grandmother     Heart Disease Paternal Grandfather     Other Other         Pat. aunt breast cancer . No family h/o ovarian cancer or DVT. Chief Complaint   Patient presents with    Gynecologic Exam     yearly pap           PE:  Vital Signs  Blood pressure 125/80, height 5' 4\" (1.626 m), weight 185 lb (83.9 kg), last menstrual period 2020, not currently breastfeeding. Estimated body mass index is 31.76 kg/m² as calculated from the following:    Height as of this encounter: 5' 4\" (1.626 m). Weight as of this encounter: 185 lb (83.9 kg). Labs:    No results found for this visit on 20. NURSE: MIRIAM    HPI: here for annual gyn exam, reports periods are spacing out \"but still having them\" normal in flow    Review of Systems   Constitutional: Negative. HENT: Negative for congestion. Respiratory: Negative for shortness of breath. Cardiovascular: Negative for chest pain. Gastrointestinal: Negative for abdominal pain. Genitourinary: Negative for dysuria. Musculoskeletal: Negative for back pain. Skin: Negative for rash. Neurological: Negative for headaches. Psychiatric/Behavioral: The patient is not nervous/anxious. Objective  Lymphatic:   no lymphadenopathy  Heent:   negative   Cor: regular rate and rhythm, no murmurs              Pul:clear to auscultation bilaterally- no wheezes, rales or rhonchi, normal air movement, no respiratory distress      GI: Abdomen soft, non-tender. BS normal. No masses,  No organomegaly           Extremities: normal strength, tone, and muscle mass   Breasts: Breast:normal appearance, no masses or tenderness   Pelvic Exam: GENITAL/URINARY:  External Genitalia:  General appearance; normal, Hair distribution; normal, Lesions absent  Urethral Meatus:  Size normal, Location normal, Lesions absent, Prolapse absent  Urethra: Fullness absent, Masses absent  Bladder:  Fullness absent, Masses absent, Tenderness absent, Cystocele absent  Vagina:  General appearance normal, Estrogen effect normal, Discharge absent, Lesions absent, Pelvic support normal  Cervix:  General appearance normal, Lesions absent, Discharge absent, Tenderness absent, Enlargement absent, Nodularity absent  Uterus:  Size normal, Tenderness absent  Adenexa: Masses absent, Tenderness absent  Anus/Perineum:  Lesions absent and Masses absent                                                  Assessment and Plan          Diagnosis Orders   1. Encounter for annual routine gynecological examination         reviewed dx of menopause and danger signs      I am having Jorge Alberto Perez maintain her Prenatal Vit-Iron Carbonyl-FA (PRENATAL PLUS IRON PO), vitamin C, MULTIPLE VITAMIN PO, and Calcium Carbonate Antacid (CALCIUM ANTACID PO). Return in about 1 year (around 12/30/2021) for yearly. She was also counseled on her preventative health maintenance recommendations and follow-up. There are no Patient Instructions on file for this visit.     Meera Dwyer,12/30/2020 10:18 AM

## 2021-01-07 ENCOUNTER — TELEPHONE (OUTPATIENT)
Dept: PRIMARY CARE CLINIC | Age: 56
End: 2021-01-07

## 2021-01-11 LAB — CYTOLOGY REPORT: NORMAL

## 2021-02-15 ENCOUNTER — HOSPITAL ENCOUNTER (OUTPATIENT)
Dept: WOMENS IMAGING | Age: 56
Discharge: HOME | End: 2021-02-17
Payer: COMMERCIAL

## 2021-02-15 DIAGNOSIS — Z12.31 BREAST CANCER SCREENING BY MAMMOGRAM: ICD-10-CM

## 2021-02-15 DIAGNOSIS — Z12.39 SCREENING BREAST EXAMINATION: ICD-10-CM

## 2021-02-15 PROCEDURE — 77063 BREAST TOMOSYNTHESIS BI: CPT

## 2021-04-11 ENCOUNTER — HOSPITAL ENCOUNTER (EMERGENCY)
Age: 56
Discharge: HOME | End: 2021-04-11
Payer: COMMERCIAL

## 2021-04-11 ENCOUNTER — APPOINTMENT (OUTPATIENT)
Dept: GENERAL RADIOLOGY | Age: 56
End: 2021-04-11
Payer: COMMERCIAL

## 2021-04-11 VITALS
TEMPERATURE: 97.9 F | BODY MASS INDEX: 28.32 KG/M2 | RESPIRATION RATE: 16 BRPM | HEART RATE: 78 BPM | OXYGEN SATURATION: 96 % | DIASTOLIC BLOOD PRESSURE: 66 MMHG | WEIGHT: 165 LBS | SYSTOLIC BLOOD PRESSURE: 112 MMHG

## 2021-04-11 DIAGNOSIS — U07.1 COVID-19: Primary | ICD-10-CM

## 2021-04-11 PROCEDURE — 71045 X-RAY EXAM CHEST 1 VIEW: CPT

## 2021-04-11 PROCEDURE — 6360000002 HC RX W HCPCS: Performed by: PHYSICIAN ASSISTANT

## 2021-04-11 PROCEDURE — 99285 EMERGENCY DEPT VISIT HI MDM: CPT

## 2021-04-11 PROCEDURE — 96372 THER/PROPH/DIAG INJ SC/IM: CPT

## 2021-04-11 RX ORDER — DEXAMETHASONE SODIUM PHOSPHATE 4 MG/ML
4 INJECTION, SOLUTION INTRA-ARTICULAR; INTRALESIONAL; INTRAMUSCULAR; INTRAVENOUS; SOFT TISSUE ONCE
Status: COMPLETED | OUTPATIENT
Start: 2021-04-11 | End: 2021-04-11

## 2021-04-11 RX ORDER — DEXAMETHASONE 6 MG/1
6 TABLET ORAL
Qty: 5 TABLET | Refills: 0 | Status: SHIPPED | OUTPATIENT
Start: 2021-04-11 | End: 2021-04-16

## 2021-04-11 RX ADMIN — DEXAMETHASONE SODIUM PHOSPHATE 4 MG: 4 INJECTION, SOLUTION INTRA-ARTICULAR; INTRALESIONAL; INTRAMUSCULAR; INTRAVENOUS; SOFT TISSUE at 17:27

## 2021-04-11 ASSESSMENT — ENCOUNTER SYMPTOMS
EYE REDNESS: 0
SORE THROAT: 0
BACK PAIN: 0
CHEST TIGHTNESS: 0
SHORTNESS OF BREATH: 0
CONSTIPATION: 0
RHINORRHEA: 0
ABDOMINAL PAIN: 0
NAUSEA: 0
WHEEZING: 0
BLOOD IN STOOL: 0
VOMITING: 0
DIARRHEA: 0
EYE DISCHARGE: 0
COUGH: 1

## 2021-04-11 NOTE — ED PROVIDER NOTES
[facility] ED  EMERGENCY DEPARTMENT ENCOUNTER      Pt Name: Crys Rocha  MRN: [id number]  [place] 1965  Date of evaluation: 4/11/2021  Provider: Boo Álvarez Dr     Chief Complaint   Patient presents with    Positive For Covid-19     pt states she tested positive 11 days ago. Pt states \"I am jsut still not feeling good\"         HISTORY OF PRESENT ILLNESS   (Location/Symptom, Timing/Onset, Context/Setting,Quality, Duration, Modifying Factors, Severity)  Note limiting factors. Crys Rocha is a61 y.o. female who presents to the emergency department with complaints of just feeling tired still after being diagnosed with COVID-19. Reports she is on day 11 of not feeling well. She states that she still has a cough. She reports she has nasal congestion as well. She states that she is able to eat and drink well she is urinating well. She denies any shortness of breath or chest pain. Denies any dizziness or headache. She denies any numbness or tingling in station denies any syncopal episodes. Reports she has just been using over-the-counter Tylenol Motrin to treat fevers which have subsided. She denies abdominal pain nausea or vomiting. She has no other complaints this time. HPI    Nursing Notes werereviewed. REVIEW OF SYSTEMS    (2-9 systems for level 4, 10 or more for level 5)     Review of Systems   Constitutional: Negative for chills, diaphoresis and fever. HENT: Positive for congestion. Negative for ear pain, rhinorrhea and sore throat. Eyes: Negative for discharge, redness and visual disturbance. Respiratory: Positive for cough. Negative for chest tightness, shortness of breath and wheezing. Cardiovascular: Negative for chest pain and palpitations. Gastrointestinal: Negative for abdominal pain, blood in stool, constipation, diarrhea, nausea and vomiting. Endocrine: Negative for polydipsia, polyphagia and polyuria.    Genitourinary: Negative for decreased urine volume, difficulty urinating, dysuria, frequency and hematuria. Musculoskeletal: Negative for arthralgias, back pain and myalgias. Skin: Negative for pallor and rash. Allergic/Immunologic: Negative for food allergies and immunocompromised state. Neurological: Negative for dizziness, syncope, weakness and light-headedness. Hematological: Negative for adenopathy. Does not bruise/bleed easily. Psychiatric/Behavioral: Negative for behavioral problems and suicidal ideas. The patient is not nervous/anxious. Except as noted above the remainder of the review of systems was reviewed and negative.        PAST MEDICAL HISTORY     Past Medical History:   Diagnosis Date    Iron deficiency anemia     Ovarian cyst          SURGICALHISTORY       Past Surgical History:   Procedure Laterality Date     SECTION      x2    COLONOSCOPY          CYST REMOVAL Right     ovary    ND COLON CA SCRN NOT  W  [facility] N/A 2017    -normal    UPPER GASTROINTESTINAL ENDOSCOPY  12-10-13         CURRENT MEDICATIONS       Discharge Medication List as of 2021  5:23 PM      CONTINUE these medications which have NOT CHANGED    Details   Calcium Carbonate Antacid (CALCIUM ANTACID PO) Take by mouthHistorical Med      vitamin C (ASCORBIC ACID) 500 MG tablet Take 500 mg by mouth dailyHistorical Med      MULTIPLE VITAMIN PO Take 2 tablets by mouth dailyHistorical Med      Prenatal Vit-Iron Carbonyl-FA (PRENATAL PLUS IRON PO) Take by mouth daily OTC Historical Med               ALLERGIES   Penicillins, Vancomycin, and Wellbutrin [bupropion]    FAMILY HISTORY       Family History   Problem Relation Age of Onset    High Blood Pressure Mother     High Blood Pressure Father     Other Father     Kidney Disease Maternal Grandmother     Stroke Maternal Grandfather     Heart Disease Maternal Grandfather     Cancer Paternal Grandmother     Heart Disease Paternal Piyush Foy Other Other         Pat. aunt breast cancer . No family h/o ovarian cancer or DVT. SOCIAL HISTORY       Social History     Socioeconomic History    Marital status:      Spouse name: None    Number of children: None    Years of education: None    Highest education level: None   Occupational History    None   Social Needs    Financial resource strain: Not hard at all   [place] insecurity     Worry: Never true     Inability: Never true    Transportation needs     Medical: No     Non-medical: No   Tobacco Use    Smoking status: Never Smoker    Smokeless tobacco: Never Used   Substance and Sexual Activity    Alcohol use: Yes     Comment: Occ    Drug use: No    Sexual activity: Yes     Partners: Male   Lifestyle    Physical activity     Days per week: None     Minutes per session: None    Stress: None   Relationships    Social connections     Talks on phone: None     Gets together: None     Attends Bahai service: None     Active member of club or organization: None     Attends meetings of clubs or organizations: None     Relationship status: None    Intimate partner violence     Fear of current or ex partner: None     Emotionally abused: None     Physically abused: None     Forced sexual activity: None   Other Topics Concern    None   Social History Narrative    None       SCREENINGS    Fran Coma Scale  Eye Opening: Spontaneous  Best Verbal Response: Oriented  Best Motor Response: Obeys commands  Fran Coma Scale Score: 15        PHYSICAL EXAM    (up to 7 for level 4, 8 or more for level 5)     ED Triage Vitals   BP Temp Temp Source Pulse Resp SpO2 Height Weight   04/11/21 1613 04/11/21 1612 04/11/21 1612 04/11/21 1612 04/11/21 1612 04/11/21 1612 -- 04/11/21 1612   122/81 97.9 °F (36.6 °C) Tympanic 98 16 99 %  165 lb (74.8 kg)       Physical Exam  Vitals signs and nursing note reviewed. Constitutional:       General: She is not in acute distress.      Appearance: Normal appearance. She is well-developed. She is not ill-appearing, toxic-appearing or diaphoretic. HENT:      Head: Normocephalic and atraumatic. Right Ear: External ear normal.      Left Ear: External ear normal.      Ears:      Comments: Nonerythematous TMs     Nose: Congestion present. Mouth/Throat:      Mouth: Mucous membranes are moist.      Pharynx: No oropharyngeal exudate or posterior oropharyngeal erythema. Eyes:      General: No scleral icterus. Right eye: No discharge. Left eye: No discharge. Extraocular Movements: Extraocular movements intact. Conjunctiva/sclera: Conjunctivae normal.      Pupils: Pupils are equal, round, and reactive to light. Comments: Conjunctive are not pale   Neck:      Musculoskeletal: Normal range of motion and neck supple. Thyroid: No thyromegaly. Trachea: No tracheal deviation. Cardiovascular:      Rate and Rhythm: Normal rate and regular rhythm. Heart sounds: No friction rub. No gallop. Pulmonary:      Effort: Pulmonary effort is normal. No respiratory distress. Breath sounds: No stridor. Rhonchi (mild) present. No wheezing. Abdominal:      General: Bowel sounds are normal. There is no distension. Palpations: Abdomen is soft. Tenderness: There is no abdominal tenderness. There is no guarding or rebound. Musculoskeletal: Normal range of motion. General: No tenderness or deformity. Lymphadenopathy:      Cervical: No cervical adenopathy. Skin:     General: Skin is warm and dry. Capillary Refill: Capillary refill takes less than 2 seconds. Findings: No erythema or rash. Neurological:      General: No focal deficit present. Mental Status: She is alert and oriented to person, place, and time. Cranial Nerves: No cranial nerve deficit. Motor: No abnormal muscle tone. Deep Tendon Reflexes: Reflexes are normal and symmetric.  Reflexes normal.   Psychiatric:         Mood in the next 2 days. Patient verbalized understanding of this. We went over medications. Strict return warnings were given. Patient will return to the emergency room as needed with new or worsening complaints. Procedures    FINAL IMPRESSION      1. COVID-19        DISPOSITION/PLAN   DISPOSITION Decision To Discharge 04/11/2021 05:21:47 PM      PATIENT REFERRED TO:  [facility] ED  [address] [facility]  [address] [place] [address] Road  [phone]    If symptoms worsen, As needed    [address] [place] [address]  [phone]    Schedule an appointment as soon as possible for a visit in 2 days        DISCHARGE MEDICATIONS:  Discharge Medication List as of 4/11/2021  5:23 PM      START taking these medications    Details   dexamethasone (DECADRON) 6 MG tablet Take 1 tablet by mouth daily (with breakfast) for 5 days, Disp-5 tablet, R-0Normal                    Summation      Patient Course:      ED Medications administered this visit:    Medications   dexamethasone (DECADRON) injection 4 mg (4 mg Intramuscular Given 4/11/21 1727)       New Prescriptions from this visit:    Discharge Medication List as of 4/11/2021  5:23 PM      START taking these medications    Details   dexamethasone (DECADRON) 6 MG tablet Take 1 tablet by mouth daily (with breakfast) for 5 days, Disp-5 tablet, R-0Normal             Follow-up:  [facility]  [address]  [phone]    If symptoms worsen, As needed    [address] [place] [address]  [phone]    Schedule an appointment as soon as possible for a visit in 2 days          Final Impression:   1.  COVID-19               (Please note that portions of this note were completed with a voice recognition program.  Efforts were made to edit the dictations but occasionally words are mis-transcribed.)           Parker Reveles PA-C  04/11/21 9825

## 2021-04-12 ENCOUNTER — CARE COORDINATION (OUTPATIENT)
Dept: CARE COORDINATION | Age: 56
End: 2021-04-12

## 2021-04-12 NOTE — CARE COORDINATION
Reason For Call Today:  -Attempted to reach [place] [address] today for ED Follow Up/ COVID at risk monitoring following her ED visit to [facility] on 4/1//2021  -COVID: Positive 12 days ago per ED note (patient aware)   -My Chart: Active   -Unable to reach [place] [address] today   -Left a voicemail message with reason for call. Requesting a return [facility] when patient is able if she has any further questions/concerns to [phone].    -Noted she called and made a transitional appointment with PCP already today     Future Appointments   Date Time Provider Elisabet Leigh   4/13/2021  2:40 PM JUSTIN Schmitz - CNP [facility]   1/5/2022  4:10 PM Pedro Guaman, Miguel Angel Byers [facility] OB/GYN [facility]       No further outreach calls planned at this time. Patient has made follow up appointment with PCP. My chart is active. Patient has this [facility] phone number if future needs arise.

## 2021-04-13 ENCOUNTER — OFFICE VISIT (OUTPATIENT)
Dept: PRIMARY CARE CLINIC | Age: 56
End: 2021-04-13
Payer: COMMERCIAL

## 2021-04-13 VITALS
SYSTOLIC BLOOD PRESSURE: 120 MMHG | HEART RATE: 78 BPM | BODY MASS INDEX: 30.73 KG/M2 | DIASTOLIC BLOOD PRESSURE: 64 MMHG | WEIGHT: 179 LBS | TEMPERATURE: 97.8 F | OXYGEN SATURATION: 98 % | RESPIRATION RATE: 20 BRPM

## 2021-04-13 DIAGNOSIS — U07.1 COVID-19 VIRUS INFECTION: Primary | ICD-10-CM

## 2021-04-13 DIAGNOSIS — J20.9 ACUTE BRONCHITIS, UNSPECIFIED ORGANISM: ICD-10-CM

## 2021-04-13 PROCEDURE — 99214 OFFICE O/P EST MOD 30 MIN: CPT | Performed by: NURSE PRACTITIONER

## 2021-04-13 PROCEDURE — G8427 DOCREV CUR MEDS BY ELIG CLIN: HCPCS | Performed by: NURSE PRACTITIONER

## 2021-04-13 PROCEDURE — 1036F TOBACCO NON-USER: CPT | Performed by: NURSE PRACTITIONER

## 2021-04-13 PROCEDURE — 3017F COLORECTAL CA SCREEN DOC REV: CPT | Performed by: NURSE PRACTITIONER

## 2021-04-13 PROCEDURE — G8417 CALC BMI ABV UP PARAM F/U: HCPCS | Performed by: NURSE PRACTITIONER

## 2021-04-13 RX ORDER — AZITHROMYCIN 250 MG/1
250 TABLET, FILM COATED ORAL SEE ADMIN INSTRUCTIONS
Qty: 6 TABLET | Refills: 0 | Status: SHIPPED | OUTPATIENT
Start: 2021-04-13 | End: 2021-04-18

## 2021-04-13 ASSESSMENT — ENCOUNTER SYMPTOMS
COUGH: 1
SHORTNESS OF BREATH: 0
DIARRHEA: 0
ABDOMINAL PAIN: 0
NAUSEA: 0
CONSTIPATION: 0
SORE THROAT: 0
WHEEZING: 1
RHINORRHEA: 0
VOMITING: 0

## 2021-04-13 ASSESSMENT — PATIENT HEALTH QUESTIONNAIRE - PHQ9
SUM OF ALL RESPONSES TO PHQ9 QUESTIONS 1 & 2: 0
SUM OF ALL RESPONSES TO PHQ QUESTIONS 1-9: 0
1. LITTLE INTEREST OR PLEASURE IN DOING THINGS: 0
2. FEELING DOWN, DEPRESSED OR HOPELESS: 0

## 2021-04-13 NOTE — PATIENT INSTRUCTIONS
SURVEY:    You may be receiving a survey from [employer] regarding your visit today. Please complete the survey to enable us to provide the highest quality of care to you and your family. If you cannot score us a very good on any question, please call the office to discuss how we could have made your experience a very good one. Thank you.

## 2021-04-13 NOTE — PROGRESS NOTES
strenuous activity and URI. Her symptoms are alleviated by rest (oral steroid). She reports moderate improvement on treatment. Her past medical history is significant for bronchitis. There is no history of asthma, COPD, emphysema or pneumonia. Past Medical History:     Past Medical History:   Diagnosis Date    Iron deficiency anemia     Ovarian cyst       Reviewed all health maintenance requirements and ordered appropriate tests  There are no preventive care reminders to display for this patient. Past Surgical History:     Past Surgical History:   Procedure Laterality Date     SECTION      x2    COLONOSCOPY      2005    CYST REMOVAL Right     ovary    CA COLON CA SCRN NOT  [address] IND N/A 2017    -normal    UPPER GASTROINTESTINAL ENDOSCOPY  12-10-13        Medications:       Prior to Admission medications    Medication Sig Start Date End Date Taking? Authorizing Provider   azithromycin (ZITHROMAX) 250 MG tablet Take 1 tablet by mouth See Admin Instructions for 5 days 500mg on day 1 followed by 250mg on days 2 - 5 21 Yes JUSTIN Bradley - CNP   dexamethasone (DECADRON) 6 MG tablet Take 1 tablet by mouth daily (with breakfast) for 5 days 21 Yes Juliane Simmons PA-C   Calcium Carbonate Antacid (CALCIUM ANTACID PO) Take by mouth   Yes Historical Provider, MD   vitamin C (ASCORBIC ACID) 500 MG tablet Take 500 mg by mouth daily   Yes Historical Provider, MD   MULTIPLE VITAMIN PO Take 2 tablets by mouth daily   Yes Historical Provider, MD        Allergies:       Penicillins, Vancomycin, and Wellbutrin [bupropion]    Social History:     Tobacco:    reports that she has never smoked. She has never used smokeless tobacco.  Alcohol:      reports current alcohol use. Drug Use:  reports no history of drug use.     Family History:     Family History   Problem Relation Age of Onset    High Blood Pressure Mother     High Blood Pressure Father     Other Father Bowel sounds are normal. There is no distension. Palpations: Abdomen is soft. Tenderness: There is no abdominal tenderness. Musculoskeletal:      Right lower leg: No edema. Left lower leg: No edema. Lymphadenopathy:      Cervical: No cervical adenopathy. Skin:     Findings: No rash. Neurological:      Mental Status: She is alert and oriented to person, place, and time. Psychiatric:         Mood and Affect: Mood is anxious (mild). Behavior: Behavior normal.         Data:     Lab Results   Component Value Date     01/25/2020    K 4.2 01/25/2020     01/25/2020    CO2 29 01/25/2020    BUN 16 01/25/2020    CREATININE 0.82 01/25/2020    GLUCOSE 78 01/25/2020    PROT 7.4 01/25/2020    LABALBU 4.2 01/25/2020    BILITOT 0.8 01/25/2020    BILITOT NEGATIVE 08/31/2017    ALKPHOS 73 01/25/2020    ALKPHOS 65 08/27/2013    AST 27 01/25/2020    ALT 33 01/25/2020     Lab Results   Component Value Date    WBC 3.8 01/25/2020    WBC 6.9 02/25/2019    RBC 4.68 01/25/2020    HGB 14.9 01/25/2020    HCT 43.5 01/25/2020    MCV 93 01/25/2020    MCH 31.8 01/25/2020    MCHC 34.2 01/25/2020    RDW 13.1 01/25/2020     01/25/2020     02/25/2019    MPV 8.5 01/25/2020     Lab Results   Component Value Date    TSH 2.72 11/16/2020     Lab Results   Component Value Date    CHOL 157 03/17/2017    HDL 61 03/17/2017       Assessment/Plan:      Diagnosis Orders   1. COVID-19 virus infection     2. Acute bronchitis, unspecified organism  azithromycin (ZITHROMAX) 250 MG tablet     I believe she may have an early pneumonia developing. She will continue steroid and start zpack. Off work the rest of the week. Call if not improving. ER if worsening. 1.  Judi received counseling on the following healthy behaviors: nutrition, exercise and medication adherence  2. Patient given educational materials - see patient instructions  3.   Was a self-tracking handout given in paper form or via

## 2021-04-13 NOTE — LETTER
[address] [facility] [place]  [address] [place] [address]  [place] [address] [facility]  Phone: [phone]  Fax: [address] [facility], APRN - CNP        April 13, 2021     Patient: Jake Alcantar   YOB: 1965   Date of Visit: 4/13/2021       To Whom It May Concern: It is my medical opinion that Remonia Cara should remain out of work until 04/19/2021. If you have any questions or concerns, please don't hesitate to call.     Sincerely,        Aimee Williamson, APRN - CNP

## 2022-01-05 ENCOUNTER — OFFICE VISIT (OUTPATIENT)
Dept: OBGYN | Age: 57
End: 2022-01-05
Payer: COMMERCIAL

## 2022-01-05 VITALS
HEIGHT: 64 IN | DIASTOLIC BLOOD PRESSURE: 82 MMHG | SYSTOLIC BLOOD PRESSURE: 130 MMHG | BODY MASS INDEX: 31.24 KG/M2 | WEIGHT: 183 LBS

## 2022-01-05 DIAGNOSIS — N92.6 IRREGULAR MENSES: ICD-10-CM

## 2022-01-05 DIAGNOSIS — Z01.419 ENCOUNTER FOR ANNUAL ROUTINE GYNECOLOGICAL EXAMINATION: Primary | ICD-10-CM

## 2022-01-05 DIAGNOSIS — Z12.31 SCREENING MAMMOGRAM, ENCOUNTER FOR: ICD-10-CM

## 2022-01-05 PROCEDURE — G8484 FLU IMMUNIZE NO ADMIN: HCPCS | Performed by: ADVANCED PRACTICE MIDWIFE

## 2022-01-05 PROCEDURE — 99396 PREV VISIT EST AGE 40-64: CPT | Performed by: ADVANCED PRACTICE MIDWIFE

## 2022-01-05 ASSESSMENT — ENCOUNTER SYMPTOMS
SHORTNESS OF BREATH: 0
BACK PAIN: 0
ABDOMINAL PAIN: 0

## 2022-01-05 NOTE — PROGRESS NOTES
YEARLY PHYSICAL    Date of service: 2022    Trent Rodriguez  Is a 64 y.o.   female    PT's PCP is: JUSTIN Jefferson - CNP     : 1965                                             Subjective:       Patient's last menstrual period was 2021 (exact date). Are your menses regular: no    OB History    Para Term  AB Living   4       2 2   SAB IAB Ectopic Molar Multiple Live Births   2                # Outcome Date GA Lbr Roger/2nd Weight Sex Delivery Anes PTL Lv   4             3             2 SAB            1 SAB                 Social History     Tobacco Use   Smoking Status Never Smoker   Smokeless Tobacco Never Used        Social History     Substance and Sexual Activity   Alcohol Use Yes    Comment: Occ       Family History   Problem Relation Age of Onset    High Blood Pressure Mother     High Blood Pressure Father     Other Father     Kidney Disease Maternal Grandmother     Stroke Maternal Grandfather     Heart Disease Maternal Grandfather     Cancer Paternal Grandmother     Heart Disease Paternal Grandfather     Other Other         Pat. aunt breast cancer . No family h/o ovarian cancer or DVT. Any family history of breast or ovarian cancer: Yes, breast pat.  aunt    Any family history of blood clots: No    Have you had a positive covid test: Yes    Have you had the covid immunization: No      Allergies: Penicillins, Vancomycin, and Wellbutrin [bupropion]      Current Outpatient Medications:     Zinc Sulfate (ZINC 15 PO), Take by mouth, Disp: , Rfl:     vitamin C (ASCORBIC ACID) 500 MG tablet, Take 500 mg by mouth daily, Disp: , Rfl:     MULTIPLE VITAMIN PO, Take 2 tablets by mouth daily, Disp: , Rfl:     Calcium Carbonate Antacid (CALCIUM ANTACID PO), Take by mouth (Patient not taking: Reported on 2022), Disp: , Rfl:     Social History     Substance and Sexual Activity Sexual Activity Yes    Partners: Male       Any bleeding or pain with intercourse: No    Last Yearly:  2020    Last pap: 2020    Last HPV: 2016    Last Mammogram: 02/15/2021    Last Dexascan never    Last colorectal screen- type:colonoscopy*  date  2017    Do you do self breast exams: Yes    Past Medical History:   Diagnosis Date    Iron deficiency anemia     Ovarian cyst        Past Surgical History:   Procedure Laterality Date     SECTION      x2    COLONOSCOPY      2005    CYST REMOVAL Right     ovary    [place] COLON [place] SCRN NOT  [address] IND N/A 2017    -normal    UPPER GASTROINTESTINAL ENDOSCOPY  12-10-13       Family History   Problem Relation Age of Onset    High Blood Pressure Mother     High Blood Pressure Father     Other Father     Kidney Disease Maternal Grandmother     Stroke Maternal Grandfather     Heart Disease Maternal Grandfather     Cancer Paternal Grandmother     Heart Disease Paternal Grandfather     Other Other         Pat. aunt breast cancer . No family h/o ovarian cancer or DVT. Chief Complaint   Patient presents with    Gynecologic Exam     Yearly exam. Last pap 2020 negative, HPV 2016 not detected. Irregular periods for last year or two. PE:  Vital Signs  Blood pressure 130/82, height 5' 4\" (1.626 m), weight 183 lb (83 kg), last menstrual period 2021, not currently breastfeeding. Estimated body mass index is 31.41 kg/m² as calculated from the following:    Height as of this encounter: 5' 4\" (1.626 m). Weight as of this encounter: 183 lb (83 kg). Labs:    No results found for this visit on 22. No data recorded    NURSE: Celina YI    HPI: here for annual gyn exam, stil having irregular menses    Review of Systems   Constitutional: Negative. HENT: Negative for congestion. Respiratory: Negative for shortness of breath. Cardiovascular: Negative for chest pain.    Gastrointestinal: Negative for abdominal pain.   Genitourinary: Negative for dysuria. Musculoskeletal: Negative for back pain. Skin: Negative for rash. Neurological: Negative for headaches. Psychiatric/Behavioral: The patient is not nervous/anxious. Objective  Lymphatic:   no lymphadenopathy  Heent:   negative   Cor: regular rate and rhythm, no murmurs              Pul:clear to auscultation bilaterally- no wheezes, rales or rhonchi, normal air movement, no respiratory distress      GI: Abdomen soft, non-tender. BS normal. No masses,  No organomegaly           Extremities: normal strength, tone, and muscle mass   Breasts: Breast:normal appearance, no masses or tenderness   Pelvic Exam: GENITAL/URINARY:  External Genitalia:  General appearance; normal, Hair distribution; normal, Lesions absent  Urethral Meatus:  Size normal, Location normal, Lesions absent, Prolapse absent  Urethra: Fullness absent, Masses absent  Bladder:  Fullness absent, Masses absent, Tenderness absent, Cystocele absent  Vagina:  General appearance normal, Estrogen effect normal, Discharge absent, Lesions absent, Pelvic support normal  Cervix:  General appearance normal, Lesions absent, Discharge absent, Tenderness absent, Enlargement absent, Nodularity absent  Uterus:  Size normal, Tenderness absent  Adenexa: Masses absent, Tenderness absent  Anus/Perineum:  Lesions absent and Masses absent                                                           Assessment and Plan          Diagnosis Orders   1. Encounter for annual routine gynecological examination     2. Irregular menses  Follicle Stimulating Hormone    Luteinizing Hormone    Estradiol    TSH without Reflex   3. Screening mammogram, encounter for  [facility] JESSICA DIGITAL SCREEN BILATERAL       consider sono after labs in      I am having Cresencio Ugarte maintain her vitamin C, MULTIPLE VITAMIN PO, Calcium Carbonate Antacid (CALCIUM ANTACID PO), and Zinc Sulfate (ZINC 15 PO).     Return for will call with

## 2022-01-09 LAB
ESTRADIOL LEVEL: <15 PG/ML
FOLLICLE STIMULATING HORMONE: 54.7 MIU/ML
LH: 28.8 MIU/ML

## 2022-01-10 NOTE — RESULT ENCOUNTER NOTE
Pt. notified of results and voices understanding. Scheduled for ultrasound and endometrial biopsy 02/03/2022.

## 2022-02-03 ENCOUNTER — HOSPITAL ENCOUNTER (OUTPATIENT)
Age: 57
Setting detail: SPECIMEN
Discharge: HOME | End: 2022-02-03
Payer: COMMERCIAL

## 2022-02-03 ENCOUNTER — PROCEDURE VISIT (OUTPATIENT)
Dept: OBGYN | Age: 57
End: 2022-02-03
Payer: COMMERCIAL

## 2022-02-03 VITALS
DIASTOLIC BLOOD PRESSURE: 74 MMHG | SYSTOLIC BLOOD PRESSURE: 130 MMHG | WEIGHT: 185 LBS | HEIGHT: 64 IN | BODY MASS INDEX: 31.58 KG/M2

## 2022-02-03 DIAGNOSIS — N95.0 PMB (POSTMENOPAUSAL BLEEDING): ICD-10-CM

## 2022-02-03 DIAGNOSIS — N95.0 PMB (POSTMENOPAUSAL BLEEDING): Primary | ICD-10-CM

## 2022-02-03 PROCEDURE — 58100 BIOPSY OF UTERUS LINING: CPT | Performed by: ADVANCED PRACTICE MIDWIFE

## 2022-02-03 PROCEDURE — 88305 TISSUE EXAM BY PATHOLOGIST: CPT

## 2022-02-03 NOTE — PROGRESS NOTES
PROBLEM VISIT     Date of service: 2/3/2022    Dajuan Agarwal  Is a 62 y.o.  female    PT's PCP is: JUSTIN Roque CNP     : 1965                                             Subjective:       Patient's last menstrual period was 2021 (approximate). OB History    Para Term  AB Living   4       2 2   SAB IAB Ectopic Molar Multiple Live Births   2                # Outcome Date GA Lbr Roger/2nd Weight Sex Delivery Anes PTL Lv   4             3             2 SAB            1 SAB                 Social History     Tobacco Use   Smoking Status Never Smoker   Smokeless Tobacco Never Used        Social History     Substance and Sexual Activity   Alcohol Use Yes    Comment: Occ       Allergies: Penicillins, Vancomycin, and Wellbutrin [bupropion]      Current Outpatient Medications:     Zinc Sulfate (ZINC 15 PO), Take by mouth, Disp: , Rfl:     vitamin C (ASCORBIC ACID) 500 MG tablet, Take 500 mg by mouth daily, Disp: , Rfl:     MULTIPLE VITAMIN PO, Take 2 tablets by mouth daily, Disp: , Rfl:     Calcium Carbonate Antacid (CALCIUM ANTACID PO), Take by mouth (Patient not taking: Reported on 2022), Disp: , Rfl:     Social History     Substance and Sexual Activity   Sexual Activity Yes    Partners: Male       Last Yearly:      Last pap:     Last HPV:     Have you had a positive covid test: Yes    Have you had the covid immunization: No    Chief Complaint   Patient presents with    Vaginal Bleeding     Follow up in house ultrasound. H/O PMB. Last bled early 2021. Follow up labs. PE:  Vital Signs  Blood pressure 130/74, height 5' 4\" (1.626 m), weight 185 lb (83.9 kg), last menstrual period 2021, not currently breastfeeding. Estimated body mass index is 31.76 kg/m² as calculated from the following:    Height as of this encounter: 5' 4\" (1.626 m). Weight as of this encounter: 185 lb (83.9 kg).     No data recorded    NURSE: Owen CALLEN    HPI: here for w/u of PMB sono unremarkable except for small fibroid     PT denies fever, chills, nausea and vomiting       Objective   No acute distress  Excellent communications  Well-nourished    Speculum exam, cervix visualized, cleaned with betadine, os initially stenotic but tenaculum placed on anterior lip of cervix and cervix dilated with disposable cervical canal dilator, uterus sounded to 8 cm and pipelle used to aspirate scant tissue, sent to pathology,  Tenaculum and speculum removed and no bleeding noted from tenaculum sites, patient tolerated procedure well. Results reviewed today:  Sono:  UTERUS:anteverted, heterogeneous echo pattern; ? Fibroid visualized - hypoechoic mass  Fibroid arpit- 1.6cm x 1.6cm x 1.3cm     ENDO:4mm in thickness     RT. OVARY:seen, wnl     LT. OVARY:seen, wnl    No results found for this visit on 02/03/22. Assessment and Plan          Diagnosis Orders   1. PMB (postmenopausal bleeding)  LA BIOPSY OF UTERUS LINING    Surgical Pathology             I am having Karli Zimmerman maintain her vitamin C, MULTIPLE VITAMIN PO, Calcium Carbonate Antacid (CALCIUM ANTACID PO), and Zinc Sulfate (ZINC 15 PO). Return for will call with results. There are no Patient Instructions on file for this visit. Over 50% of time spent on counseling and care coordination on: see assessment and plan,  She was also counseled on her preventative health maintenance recommendations and follow-up.         FF time: 20 min      JUSTIN Carson CNM,2/4/2022 10:26 AM

## 2022-02-07 LAB — SURGICAL PATHOLOGY REPORT: NORMAL

## 2022-02-09 NOTE — RESULT ENCOUNTER NOTE
Left message for pt. To call office for results. PACU Transfer to [facility]    Vitals:    08/07/20 1230   BP: (!) 153/70   Pulse: 79   Resp: 16   Temp: 97.1 °F (36.2 °C)   SpO2: 98%     BP within 20%  Of pre op level, does not meet treatment parameters    Intake/Output Summary (Last 24 hours) at 8/7/2020 1251  Last data filed at 8/7/2020 1230  Gross per 24 hour   Intake 675 ml   Output 5 ml   Net 670 ml       Pain assessment: no pain, percocet given per Dr. Josh Spivey bedside order    Pain Level: 0    Patient transferred to care of [facility] RN.    8/7/2020 12:51 PM

## 2022-02-09 NOTE — RESULT ENCOUNTER NOTE
Pt. notified of results and voices understanding. Scheduled to see Dr. Veronica Sullivan 02/22/2022.

## 2022-02-22 ENCOUNTER — OFFICE VISIT (OUTPATIENT)
Dept: OBGYN | Age: 57
End: 2022-02-22
Payer: COMMERCIAL

## 2022-02-22 VITALS
SYSTOLIC BLOOD PRESSURE: 128 MMHG | HEIGHT: 64 IN | BODY MASS INDEX: 32.1 KG/M2 | WEIGHT: 188 LBS | DIASTOLIC BLOOD PRESSURE: 78 MMHG

## 2022-02-22 DIAGNOSIS — D25.9 UTERINE LEIOMYOMA, UNSPECIFIED LOCATION: ICD-10-CM

## 2022-02-22 DIAGNOSIS — N95.0 PMB (POSTMENOPAUSAL BLEEDING): Primary | ICD-10-CM

## 2022-02-22 PROCEDURE — 1036F TOBACCO NON-USER: CPT | Performed by: OBSTETRICS & GYNECOLOGY

## 2022-02-22 PROCEDURE — G8417 CALC BMI ABV UP PARAM F/U: HCPCS | Performed by: OBSTETRICS & GYNECOLOGY

## 2022-02-22 PROCEDURE — G8427 DOCREV CUR MEDS BY ELIG CLIN: HCPCS | Performed by: OBSTETRICS & GYNECOLOGY

## 2022-02-22 PROCEDURE — 99213 OFFICE O/P EST LOW 20 MIN: CPT | Performed by: OBSTETRICS & GYNECOLOGY

## 2022-02-22 PROCEDURE — 3017F COLORECTAL CA SCREEN DOC REV: CPT | Performed by: OBSTETRICS & GYNECOLOGY

## 2022-02-22 PROCEDURE — G8484 FLU IMMUNIZE NO ADMIN: HCPCS | Performed by: OBSTETRICS & GYNECOLOGY

## 2022-02-22 NOTE — PROGRESS NOTES
DATE OF VISIT:  2/22/22    PATIENT NAME:  Claudine Elizalde     YOB: 1965    REASON FOR VISIT:    Chief Complaint   Patient presents with    Vaginal Bleeding     Patient is being seen for surgical discussion. She notes her last episode of PMB in November. HISTORY OF PRESENT ILLNESS:  Pt had seen SS at time of annual with c/o irreg bleeding - last had bleeding in Nov; USN showed:UTERUS:anteverted, heterogeneous echo pattern; 7.7 cm in size; ? Fibroid arpit- 1.6cm x 1.6cm x 1.3cm     ENDO:4mm in thickness     RT. OVARY:seen, wnl     LT. OVARY:seen, wnl      Labs found pt to be menopausal; Endometrial biopsy was inactive endometrium without atypia; disc'd hysteroscopy d&c v tlh v expectant management; pt ok with watching and waiting as she has had no further bleeding since Nov        Patient's last menstrual period was 11/09/2021 (approximate). Vitals:    02/22/22 1451   BP: 128/78   Weight: 188 lb (85.3 kg)   Height: 5' 4\" (1.626 m)     Body mass index is 32.27 kg/m². Allergies   Allergen Reactions    Penicillins     Vancomycin     Wellbutrin [Bupropion]      Current Outpatient Medications   Medication Sig Dispense Refill    Zinc Sulfate (ZINC 15 PO) Take by mouth      vitamin C (ASCORBIC ACID) 500 MG tablet Take 500 mg by mouth daily      MULTIPLE VITAMIN PO Take 2 tablets by mouth daily      Calcium Carbonate Antacid (CALCIUM ANTACID PO) Take by mouth (Patient not taking: Reported on 1/5/2022)       No current facility-administered medications for this visit. Social History     Socioeconomic History    Marital status:      Spouse name: None    Number of children: None    Years of education: None    Highest education level: None   Occupational History    None   Tobacco Use    Smoking status: Never Smoker    Smokeless tobacco: Never Used   Vaping Use    Vaping Use: Never used   Substance and Sexual Activity    Alcohol use: Yes     Comment: Occ    Drug use:  No  Sexual activity: Yes     Partners: Male   Other Topics Concern    None   Social History Narrative    None     Social Determinants of Health     Financial Resource Strain:     Difficulty of Paying Living Expenses: Not on file   Food Insecurity:     Worried About Running Out of Food in the Last Year: Not on file    Rohini of Food in the Last Year: Not on file   Transportation Needs:     Lack of Transportation (Medical): Not on file    Lack of Transportation (Non-Medical): Not on file   Physical Activity:     Days of Exercise per Week: Not on file    Minutes of Exercise per Session: Not on file   Stress:     Feeling of Stress : Not on file   Social Connections:     Frequency of Communication with Friends and Family: Not on file    Frequency of Social Gatherings with Friends and Family: Not on file    Attends [employer] Services: Not on file    Active Member of [address] [place] [address] [employer] or Organizations: Not on file    Attends Club or Organization Meetings: Not on file    Marital Status: Not on file   Intimate Partner Violence:     Fear of Current or Ex-Partner: Not on file    Emotionally Abused: Not on file    Physically Abused: Not on file    Sexually Abused: Not on file   Housing Stability:     Unable to Pay for Housing in the Last Year: Not on file    Number of [place] in the Last Year: Not on file    Unstable Housing in the Last Year: Not on file       REVIEW OF SYSTEMS:  Review of Systems   Constitutional: Negative for chills and fever. Genitourinary: Positive for menstrual problem. Negative for pelvic pain and vaginal discharge. PHYSICAL EXAM:  /78   Ht 5' 4\" (1.626 m)   Wt 188 lb (85.3 kg)   LMP 11/09/2021 (Approximate)   BMI 32.27 kg/m²   Physical Exam  Constitutional:       Appearance: Normal appearance. HENT:      Head: Normocephalic and atraumatic. Eyes:      Extraocular Movements: Extraocular movements intact. Pupils: Pupils are equal, round, and reactive to light. Cardiovascular:      Rate and Rhythm: Normal rate. Pulmonary:      Effort: Pulmonary effort is normal.   Musculoskeletal:         General: Normal range of motion. Neurological:      Mental Status: She is alert and oriented to person, place, and time. Skin:     General: Skin is warm and dry. Psychiatric:         Mood and Affect: Mood normal.         Behavior: Behavior normal.         Thought Content: Thought content normal.         Judgment: Judgment normal.       The patient, Yara Mcgowan is a 62 y.o. female, was seen with a total time spent of 20 minutes for the visit on this date of service by the E/M provider. The time component had both face to face and non face to face time spent in determining the total time component. Counseling and education regarding her diagnosis listed below and her options regarding those diagnoses were also included in determining her time component. Diagnosis Orders   1. PMB (postmenopausal bleeding)     2. Uterine leiomyoma, unspecified location          The patient had her preventative health maintenance recommendations and follow-up reviewed with her at the completion of her visit. ASSESSMENT:      1. PMB (postmenopausal bleeding)    2. Uterine leiomyoma, unspecified location        PLAN:  No orders of the defined types were placed in this encounter.     Return if symptoms worsen or fail to improve, for annual.       Electronically signed by Prema Caceres DO on 02/22/22

## 2022-03-22 ENCOUNTER — HOSPITAL ENCOUNTER (OUTPATIENT)
Dept: WOMENS IMAGING | Age: 57
Discharge: HOME | End: 2022-03-24
Payer: COMMERCIAL

## 2022-03-22 DIAGNOSIS — Z12.31 SCREENING MAMMOGRAM, ENCOUNTER FOR: ICD-10-CM

## 2022-03-22 PROCEDURE — 77063 BREAST TOMOSYNTHESIS BI: CPT

## 2022-09-06 ENCOUNTER — OFFICE VISIT (OUTPATIENT)
Dept: PRIMARY CARE CLINIC | Age: 57
End: 2022-09-06
Payer: COMMERCIAL

## 2022-09-06 ENCOUNTER — HOSPITAL ENCOUNTER (OUTPATIENT)
Age: 57
Discharge: HOME | End: 2022-09-06
Payer: COMMERCIAL

## 2022-09-06 VITALS
TEMPERATURE: 98.1 F | OXYGEN SATURATION: 98 % | RESPIRATION RATE: 18 BRPM | BODY MASS INDEX: 32.48 KG/M2 | DIASTOLIC BLOOD PRESSURE: 74 MMHG | HEART RATE: 78 BPM | SYSTOLIC BLOOD PRESSURE: 130 MMHG | WEIGHT: 189.2 LBS

## 2022-09-06 DIAGNOSIS — K58.0 IRRITABLE BOWEL SYNDROME WITH DIARRHEA: Primary | ICD-10-CM

## 2022-09-06 DIAGNOSIS — R19.7 DIARRHEA, UNSPECIFIED TYPE: ICD-10-CM

## 2022-09-06 PROCEDURE — 3017F COLORECTAL CA SCREEN DOC REV: CPT | Performed by: NURSE PRACTITIONER

## 2022-09-06 PROCEDURE — 83516 IMMUNOASSAY NONANTIBODY: CPT

## 2022-09-06 PROCEDURE — G8417 CALC BMI ABV UP PARAM F/U: HCPCS | Performed by: NURSE PRACTITIONER

## 2022-09-06 PROCEDURE — 1036F TOBACCO NON-USER: CPT | Performed by: NURSE PRACTITIONER

## 2022-09-06 PROCEDURE — 36415 COLL VENOUS BLD VENIPUNCTURE: CPT

## 2022-09-06 PROCEDURE — G8427 DOCREV CUR MEDS BY ELIG CLIN: HCPCS | Performed by: NURSE PRACTITIONER

## 2022-09-06 PROCEDURE — 99214 OFFICE O/P EST MOD 30 MIN: CPT | Performed by: NURSE PRACTITIONER

## 2022-09-06 PROCEDURE — 82784 ASSAY IGA/IGD/IGG/IGM EACH: CPT

## 2022-09-06 SDOH — ECONOMIC STABILITY: FOOD INSECURITY: WITHIN THE PAST 12 MONTHS, THE FOOD YOU BOUGHT JUST DIDN'T LAST AND YOU DIDN'T HAVE MONEY TO GET MORE.: NEVER TRUE

## 2022-09-06 SDOH — ECONOMIC STABILITY: FOOD INSECURITY: WITHIN THE PAST 12 MONTHS, YOU WORRIED THAT YOUR FOOD WOULD RUN OUT BEFORE YOU GOT MONEY TO BUY MORE.: NEVER TRUE

## 2022-09-06 ASSESSMENT — ENCOUNTER SYMPTOMS
ABDOMINAL DISTENTION: 1
ALLERGIC/IMMUNOLOGIC NEGATIVE: 1
RESPIRATORY NEGATIVE: 1
ABDOMINAL PAIN: 1
EYES NEGATIVE: 1
DIARRHEA: 1

## 2022-09-06 ASSESSMENT — PATIENT HEALTH QUESTIONNAIRE - PHQ9
1. LITTLE INTEREST OR PLEASURE IN DOING THINGS: 0
SUM OF ALL RESPONSES TO PHQ QUESTIONS 1-9: 0
SUM OF ALL RESPONSES TO PHQ QUESTIONS 1-9: 0
2. FEELING DOWN, DEPRESSED OR HOPELESS: 0
SUM OF ALL RESPONSES TO PHQ9 QUESTIONS 1 & 2: 0
SUM OF ALL RESPONSES TO PHQ QUESTIONS 1-9: 0
SUM OF ALL RESPONSES TO PHQ QUESTIONS 1-9: 0

## 2022-09-06 ASSESSMENT — SOCIAL DETERMINANTS OF HEALTH (SDOH): HOW HARD IS IT FOR YOU TO PAY FOR THE VERY BASICS LIKE FOOD, HOUSING, MEDICAL CARE, AND HEATING?: NOT HARD AT ALL

## 2022-09-06 NOTE — PROGRESS NOTES
[facility]  Talitha Snellen, [facility]  [address] [facility] [address] [place] [address]  Dept: [phone]  Dept Fax: [phone]      Name: Katie Douglas  : 1965         Chief Complaint:     Chief Complaint   Patient presents with    Gastroesophageal Reflux     Patient c/o excessive GERD and being gassy. Patient would like to be checked for Celiac disease. History of Present Illness:      Katie Douglas is a 62 y.o.  female who presents with Gastroesophageal Reflux (Patient c/o excessive GERD and being gassy. Patient would like to be checked for Celiac disease.)    Portia 32 is here today for increased GERD symptoms and abdominal bloating, belching and diarrhea. She states at times she will get diarrhea after eating. She states this will come and go. She is concerned about celiac disease and is requesting labs drawn. She states she did go off of gluten for 2 weeks and did feel slight improvement. Denies constipation. She was taking a homopatheic digestive drop and tumeric and discontinued this and now is feeling slight improvement. She does fine with dairy. She states this has been happening for months now. She has been taking an OTC antiacid from [facility] that does help with GERD symptoms. She denies any other needs for GERD symptoms at this time.     Past Medical History:     Past Medical History:   Diagnosis Date    Iron deficiency anemia     Ovarian cyst       Reviewed all health maintenance requirements and ordered appropriate tests  Health Maintenance Due   Topic Date Due    Lipids  2022    Depression Screen  2022       Past Surgical History:     Past Surgical History:   Procedure Laterality Date     SECTION      x2    COLONOSCOPY      2005    CYST REMOVAL Right     ovary    [place] COLON [place] SCRN NOT  [address]  IND N/A 2017    -normal    UPPER GASTROINTESTINAL ENDOSCOPY  12-10-13        Medications:       Prior to Admission medications    Medication Sig Start Date End Date Taking? Authorizing Provider   vitamin C (ASCORBIC ACID) 500 MG tablet Take 500 mg by mouth daily   Yes Historical Provider, MD   MULTIPLE VITAMIN PO Take 2 tablets by mouth daily   Yes Historical Provider, MD   Zinc Sulfate (ZINC 15 PO) Take by mouth    Historical Provider, MD   Calcium Carbonate Antacid (CALCIUM ANTACID PO) Take by mouth  Patient not taking: Reported on 1/5/2022    Historical Provider, MD        Allergies:       Penicillins, Vancomycin, and Wellbutrin [bupropion]    Social History:     Tobacco:    reports that she has never smoked. She has never used smokeless tobacco.  Alcohol:      reports current alcohol use. Drug Use:  reports no history of drug use. Family History:     Family History   Problem Relation Age of Onset    High Blood Pressure Mother     High Blood Pressure Father     Other Father     Kidney Disease Maternal Grandmother     Stroke Maternal Grandfather     Heart Disease Maternal Grandfather     Cancer Paternal Grandmother     Heart Disease Paternal Grandfather     Other Other         Courtney Fraise. aunt breast cancer . No family h/o ovarian cancer or DVT. Review of Systems:     Positive and Negative as described in HPI    Review of Systems   Constitutional: Negative. HENT: Negative. Eyes: Negative. Respiratory: Negative. Cardiovascular: Negative. Gastrointestinal:  Positive for abdominal distention, abdominal pain (bloating and cramping) and diarrhea. Endocrine: Negative. Genitourinary: Negative. Musculoskeletal: Negative. Skin: Negative. Allergic/Immunologic: Negative. Neurological: Negative. Hematological: Negative. Psychiatric/Behavioral: Negative. Physical Exam:   Vitals:  /74   Pulse 78   Temp 98.1 °F (36.7 °C) (Temporal)   Resp 18   Wt 189 lb 3.2 oz (85.8 kg)   LMP 11/09/2021 (Approximate)   SpO2 98%   BMI 32.48 kg/m²     Physical Exam  Vitals and nursing note reviewed. Constitutional:       Appearance: Normal appearance. HENT:      Head: Normocephalic. Right Ear: External ear normal.      Left Ear: External ear normal.      Nose: Nose normal.      Mouth/Throat:      Mouth: Mucous membranes are moist.      Pharynx: Oropharynx is clear. Eyes:      Conjunctiva/sclera: Conjunctivae normal.      Pupils: Pupils are equal, round, and reactive to light. Cardiovascular:      Rate and Rhythm: Normal rate and regular rhythm. Heart sounds: Normal heart sounds. Pulmonary:      Effort: Pulmonary effort is normal.      Breath sounds: Normal breath sounds. Abdominal:      General: Abdomen is flat. Bowel sounds are normal. There is no distension. Palpations: Abdomen is soft. Tenderness: There is no abdominal tenderness. Musculoskeletal:         General: Normal range of motion. Cervical back: Normal range of motion. Skin:     General: Skin is warm. Capillary Refill: Capillary refill takes less than 2 seconds. Neurological:      General: No focal deficit present. Mental Status: She is alert and oriented to person, place, and time.    Psychiatric:         Mood and Affect: Mood normal.         Behavior: Behavior normal.       Data:     Lab Results   Component Value Date/Time     01/25/2020 09:25 AM    K 4.2 01/25/2020 09:25 AM     01/25/2020 09:25 AM    CO2 29 01/25/2020 09:25 AM    BUN 16 01/25/2020 09:25 AM    CREATININE 0.82 01/25/2020 09:25 AM    GLUCOSE 78 01/25/2020 09:25 AM    PROT 7.4 01/25/2020 09:25 AM    LABALBU 4.2 01/25/2020 09:25 AM    BILITOT 0.8 01/25/2020 09:25 AM    BILITOT NEGATIVE 08/31/2017 08:32 AM    ALKPHOS 73 01/25/2020 09:25 AM    ALKPHOS 65 08/27/2013 07:48 AM    AST 27 01/25/2020 09:25 AM    ALT 33 01/25/2020 09:25 AM     Lab Results   Component Value Date/Time    WBC 3.8 01/25/2020 09:25 AM    WBC 6.9 02/25/2019 03:46 PM    RBC 4.68 01/25/2020 09:25 AM    HGB 14.9 01/25/2020 09:25 AM    HCT 43.5 01/25/2020

## 2022-09-06 NOTE — PATIENT INSTRUCTIONS
SURVEY:     You may be receiving a survey from [employer] regarding your visit today. Please complete the survey to enable us to provide the highest quality of care to you and your family. If you cannot score us a very good on any question, please call the office to discuss how we could have made your experience a very good one.      Thank you,    Gabriela Williamson, APRN-CNP  Yadira Kirby, APRN-CNP  Simon Tirado, KD Lamar, JULIANNA Casas, CMA  Malathi, CMA  Kanwal, PCA  Heather, PM

## 2022-09-07 ENCOUNTER — TELEPHONE (OUTPATIENT)
Dept: PRIMARY CARE CLINIC | Age: 57
End: 2022-09-07

## 2022-09-07 LAB
GLIADIN DEAMINIDATED PEPTIDE AB IGA: 5.6 U/ML
GLIADIN DEAMINIDATED PEPTIDE AB IGG: <0.4 U/ML
IGA: 388 MG/DL (ref 70–400)
TISSUE TRANSGLUTAMINASE IGA: 0.6 U/ML

## 2022-09-07 NOTE — TELEPHONE ENCOUNTER
----- Message from JUSTIN Murphy CNP sent at 9/7/2022  2:06 PM EDT -----  Please notify patient of normal celiac panel results. I believe she may have IBS and she is to continue the plan of care we discussed at appointment.   Thanks Kyung

## 2022-10-18 ENCOUNTER — OFFICE VISIT (OUTPATIENT)
Dept: PRIMARY CARE CLINIC | Age: 57
End: 2022-10-18
Payer: COMMERCIAL

## 2022-10-18 VITALS
SYSTOLIC BLOOD PRESSURE: 122 MMHG | RESPIRATION RATE: 20 BRPM | DIASTOLIC BLOOD PRESSURE: 70 MMHG | BODY MASS INDEX: 31.94 KG/M2 | OXYGEN SATURATION: 99 % | HEART RATE: 74 BPM | TEMPERATURE: 97.7 F | WEIGHT: 186.1 LBS

## 2022-10-18 DIAGNOSIS — K21.00 GASTROESOPHAGEAL REFLUX DISEASE WITH ESOPHAGITIS WITHOUT HEMORRHAGE: Primary | ICD-10-CM

## 2022-10-18 PROCEDURE — 1036F TOBACCO NON-USER: CPT | Performed by: NURSE PRACTITIONER

## 2022-10-18 PROCEDURE — G8484 FLU IMMUNIZE NO ADMIN: HCPCS | Performed by: NURSE PRACTITIONER

## 2022-10-18 PROCEDURE — 99214 OFFICE O/P EST MOD 30 MIN: CPT | Performed by: NURSE PRACTITIONER

## 2022-10-18 PROCEDURE — G8417 CALC BMI ABV UP PARAM F/U: HCPCS | Performed by: NURSE PRACTITIONER

## 2022-10-18 PROCEDURE — G8427 DOCREV CUR MEDS BY ELIG CLIN: HCPCS | Performed by: NURSE PRACTITIONER

## 2022-10-18 PROCEDURE — 3017F COLORECTAL CA SCREEN DOC REV: CPT | Performed by: NURSE PRACTITIONER

## 2022-10-18 RX ORDER — GREEN TEA/HOODIA GORDONII 315-12.5MG
1 CAPSULE ORAL DAILY
COMMUNITY

## 2022-10-18 RX ORDER — PANTOPRAZOLE SODIUM 40 MG/1
40 TABLET, DELAYED RELEASE ORAL
Qty: 90 TABLET | Refills: 1 | Status: SHIPPED | OUTPATIENT
Start: 2022-10-18

## 2022-10-18 ASSESSMENT — ENCOUNTER SYMPTOMS
DIARRHEA: 0
VOMITING: 0
COUGH: 0
WHEEZING: 0
NAUSEA: 0
RHINORRHEA: 0
SORE THROAT: 0
HEARTBURN: 1
ABDOMINAL PAIN: 0
STRIDOR: 0
SHORTNESS OF BREATH: 0
BELCHING: 0
CONSTIPATION: 0
WATER BRASH: 0

## 2022-10-18 ASSESSMENT — PATIENT HEALTH QUESTIONNAIRE - PHQ9
SUM OF ALL RESPONSES TO PHQ QUESTIONS 1-9: 0
1. LITTLE INTEREST OR PLEASURE IN DOING THINGS: 0
SUM OF ALL RESPONSES TO PHQ QUESTIONS 1-9: 0
2. FEELING DOWN, DEPRESSED OR HOPELESS: 0
SUM OF ALL RESPONSES TO PHQ QUESTIONS 1-9: 0
SUM OF ALL RESPONSES TO PHQ9 QUESTIONS 1 & 2: 0
SUM OF ALL RESPONSES TO PHQ QUESTIONS 1-9: 0

## 2022-10-18 NOTE — PATIENT INSTRUCTIONS
SURVEY:     You may be receiving a survey from [employer] regarding your visit today. Please complete the survey to enable us to provide the highest quality of care to you and your family. If you cannot score us a very good on any question, please call the office to discuss how we could have made your experience a very good one.      Thank you,    Yury Williamson, APRN-CNP  Damaso Zuñiga, APRN-CNP  Marisabel Silva, KD Yancey, JULIANNA Dobbins, JULIANNA Srevin, CMA  Kanwal, PCA  Heather, PM

## 2022-10-18 NOTE — PROGRESS NOTES
Name: Sha Wagner  : 1965         Chief Complaint:     Chief Complaint   Patient presents with    Gastroesophageal Reflux     1 month follow up. Patient not concerned with IBS, concerned with ACID REFLUX getting worse. History of Present Illness:      Sha Wagner is a 62 y.o.  female who presents with Gastroesophageal Reflux (1 month follow up. Patient not concerned with IBS, concerned with ACID REFLUX getting worse.)      Meredith Truong is here today for a routine office visit. Has been having increased issues with acid reflux. States using natural products with limited success.  had EGD several years ago and was started on omeprazole after due to hiatal hernia.  stopped medication because she heard it could be harmful with long term use. See below for further. Gastroesophageal Reflux  She complains of early satiety and heartburn. She reports no abdominal pain, no belching, no chest pain, no coughing, no nausea, no sore throat, no stridor, no water brash or no wheezing. This is a chronic problem. The current episode started more than 1 year ago. The problem occurs frequently. The problem has been gradually worsening. The heartburn duration is several minutes. The heartburn is located in the substernum. The heartburn is of moderate intensity. The heartburn wakes her from sleep. The heartburn does not limit her activity. The heartburn changes with position. The symptoms are aggravated by certain foods and caffeine. Pertinent negatives include no fatigue, melena or weight loss. Risk factors include caffeine use and obesity. She has tried an herbal remedy for the symptoms. The treatment provided mild relief. Past procedures include an EGD. Past procedures do not include a UGI. Past invasive treatments do not include gastroplasty, gastroplication or reflux surgery.        Past Medical History:     Past Medical History:   Diagnosis Date    Iron deficiency anemia     Ovarian cyst Reviewed all health maintenance requirements and ordered appropriate tests  There are no preventive care reminders to display for this patient. Past Surgical History:     Past Surgical History:   Procedure Laterality Date     SECTION      x2    COLONOSCOPY      2005    CYST REMOVAL Right     ovary    [place] COLON CA SCRN NOT  [address] IND N/A 2017    -normal    UPPER GASTROINTESTINAL ENDOSCOPY  12-10-13        Medications:       Prior to Admission medications    Medication Sig Start Date End Date Taking? Authorizing Provider   Probiotic Acidophilus (FLORANEX) TABS Take 1 tablet by mouth daily   Yes Historical Provider, MD   pantoprazole (PROTONIX) 40 MG tablet Take 1 tablet by mouth every morning (before breakfast) 10/18/22  Yes Radha Cancer Might, APRN - CNP   vitamin C (ASCORBIC ACID) 500 MG tablet Take 500 mg by mouth daily   Yes Historical Provider, MD   MULTIPLE VITAMIN PO Take 2 tablets by mouth daily   Yes Historical Provider, MD   Zinc Sulfate (ZINC 15 PO) Take by mouth  Patient not taking: Reported on 10/18/2022    Historical Provider, MD        Allergies:       Penicillins, Vancomycin, and Wellbutrin [bupropion]    Social History:     Tobacco:    reports that she has never smoked. She has never used smokeless tobacco.  Alcohol:      reports current alcohol use. Drug Use:  reports no history of drug use. Family History:     Family History   Problem Relation Age of Onset    High Blood Pressure Mother     High Blood Pressure Father     Other Father     Kidney Disease Maternal Grandmother     Stroke Maternal Grandfather     Heart Disease Maternal Grandfather     Cancer Paternal Grandmother     Heart Disease Paternal Grandfather     Other Other         [place]ley [place]. aunt breast cancer . No family h/o ovarian cancer or DVT. Review of Systems:     Positive and Negative as described in HPI    Review of Systems   Constitutional:  Negative for chills, fatigue, fever and weight loss.    HENT:  Negative for congestion, rhinorrhea and sore throat. Eyes:  Negative for visual disturbance. Respiratory:  Negative for cough, shortness of breath and wheezing. Cardiovascular:  Negative for chest pain and palpitations. Gastrointestinal:  Positive for heartburn. Negative for abdominal pain, constipation, diarrhea, melena, nausea and vomiting. Genitourinary:  Negative for difficulty urinating and dysuria. Musculoskeletal:  Negative for gait problem, neck pain and neck stiffness. Skin:  Negative for rash. Neurological:  Negative for dizziness, syncope, light-headedness and headaches. Physical Exam:   Vitals:  /70 (Position: Sitting)   Pulse 74   Temp 97.7 °F (36.5 °C) (Temporal)   Resp 20   Wt 186 lb 1.6 oz (84.4 kg)   LMP 11/09/2021 (Approximate)   SpO2 99%   BMI 31.94 kg/m²     Physical Exam  Vitals and nursing note reviewed. Constitutional:       General: She is not in acute distress. Appearance: Normal appearance. She is obese. She is not ill-appearing. HENT:      Mouth/Throat:      Mouth: Mucous membranes are moist.      Pharynx: Oropharynx is clear. Eyes:      General: No scleral icterus. Conjunctiva/sclera: Conjunctivae normal.   Cardiovascular:      Rate and Rhythm: Normal rate and regular rhythm. Heart sounds: No murmur heard. Pulmonary:      Effort: Pulmonary effort is normal.      Breath sounds: Normal breath sounds. No wheezing. Abdominal:      General: Bowel sounds are normal. There is no distension. Palpations: Abdomen is soft. Tenderness: There is no abdominal tenderness. There is no guarding or rebound. Musculoskeletal:         General: Normal range of motion. Cervical back: Normal range of motion and neck supple. Skin:     General: Skin is warm and dry. Findings: No rash. Neurological:      Mental Status: She is alert and oriented to person, place, and time.    Psychiatric:         Mood and Affect: Mood normal.         Behavior: Behavior normal.       Data:     Lab Results   Component Value Date/Time     01/25/2020 09:25 AM    K 4.2 01/25/2020 09:25 AM     01/25/2020 09:25 AM    CO2 29 01/25/2020 09:25 AM    BUN 16 01/25/2020 09:25 AM    CREATININE 0.82 01/25/2020 09:25 AM    GLUCOSE 78 01/25/2020 09:25 AM    PROT 7.4 01/25/2020 09:25 AM    LABALBU 4.2 01/25/2020 09:25 AM    BILITOT 0.8 01/25/2020 09:25 AM    BILITOT NEGATIVE 08/31/2017 08:32 AM    ALKPHOS 73 01/25/2020 09:25 AM    ALKPHOS 65 08/27/2013 07:48 AM    AST 27 01/25/2020 09:25 AM    ALT 33 01/25/2020 09:25 AM     Lab Results   Component Value Date/Time    WBC 3.8 01/25/2020 09:25 AM    WBC 6.9 02/25/2019 03:46 PM    RBC 4.68 01/25/2020 09:25 AM    HGB 14.9 01/25/2020 09:25 AM    HCT 43.5 01/25/2020 09:25 AM    MCV 93 01/25/2020 09:25 AM    MCH 31.8 01/25/2020 09:25 AM    MCHC 34.2 01/25/2020 09:25 AM    RDW 13.1 01/25/2020 09:25 AM     01/25/2020 09:25 AM     02/25/2019 03:46 PM    MPV 8.5 01/25/2020 09:25 AM     Lab Results   Component Value Date/Time    TSH 2.72 11/16/2020 02:45 PM     Lab Results   Component Value Date/Time    CHOL 157 03/17/2017 08:51 AM    HDL 61 03/17/2017 08:51 AM       Assessment/Plan:      Diagnosis Orders   1. Gastroesophageal reflux disease with esophagitis without hemorrhage  pantoprazole (PROTONIX) 40 MG tablet    External Referral To Gastroenterology        We will start pantoprazole daily for 14 days, then recommend every other day for another 2 weeks. Call with progress. Definitely needs repeat EGD, will refer to GI for evaluation. We will see her back in 6 months for recheck, sooner if any issues. 1Diony Lau received counseling on the following healthy behaviors: nutrition, exercise, and medication adherence  2. Patient given educational materials - see patient instructions  3. Was a self-tracking handout given in paper form or via [employer]? No  If yes, see orders or list here.   4.  Discussed use, benefit, and side effects of prescribed medications. Barriers to medication compliance addressed. All patient questions answered. Pt voiced understanding. 5.  Reviewed prior labs and health maintenance  6. Continue current medications, diet and exercise. Completed Refills   Requested Prescriptions     Signed Prescriptions Disp Refills    pantoprazole (PROTONIX) 40 MG tablet 90 tablet 1     Sig: Take 1 tablet by mouth every morning (before breakfast)         Return in about 6 months (around 4/18/2023) for Check up.

## 2023-01-10 ENCOUNTER — OFFICE VISIT (OUTPATIENT)
Dept: OBGYN | Age: 58
End: 2023-01-10
Payer: COMMERCIAL

## 2023-01-10 VITALS
HEIGHT: 64 IN | SYSTOLIC BLOOD PRESSURE: 132 MMHG | WEIGHT: 182 LBS | DIASTOLIC BLOOD PRESSURE: 80 MMHG | BODY MASS INDEX: 31.07 KG/M2

## 2023-01-10 DIAGNOSIS — Z12.31 SCREENING MAMMOGRAM, ENCOUNTER FOR: ICD-10-CM

## 2023-01-10 DIAGNOSIS — Z01.419 ENCOUNTER FOR ANNUAL ROUTINE GYNECOLOGICAL EXAMINATION: Primary | ICD-10-CM

## 2023-01-10 PROCEDURE — 99396 PREV VISIT EST AGE 40-64: CPT | Performed by: ADVANCED PRACTICE MIDWIFE

## 2023-01-10 NOTE — PROGRESS NOTES
YEARLY PHYSICAL    Date of service: 1/10/2023    Katie Patel  Is a 62 y.o.  , female    PT's PCP is: JUSTIN Gonzalez - CNP     : 1965                                             Subjective:       Patient's last menstrual period was 2021 (approximate). Are your menses regular: not applicable    OB History    Para Term  AB Living   4 2 2   2 2   SAB IAB Ectopic Molar Multiple Live Births   2                # Outcome Date GA Lbr Roger/2nd Weight Sex Delivery Anes PTL Lv   4 Term            3 Term            2 SAB            1 SAB                 Social History     Tobacco Use   Smoking Status Never   Smokeless Tobacco Never        Social History     Substance and Sexual Activity   Alcohol Use Yes    Comment: I may have a beer on few occasions during the week       Family History   Problem Relation Age of Onset    High Blood Pressure Mother     High Blood Pressure Father     Other Father     Kidney Disease Maternal Grandmother     Stroke Maternal Grandfather     Heart Disease Maternal Grandfather     Cancer Paternal Grandmother     Heart Disease Paternal Grandfather     Other Other         [place]. aunt breast cancer . No family h/o ovarian cancer or DVT.         Any family history of breast or ovarian cancer: No    Any family history of blood clots: No    Have you had a positive covid test: Yes    Have you had the covid immunization: No      Allergies: Penicillins, Vancomycin, and Wellbutrin [bupropion]      Current Outpatient Medications:     Probiotic Acidophilus (FLORANEX) TABS, Take 1 tablet by mouth daily, Disp: , Rfl:     pantoprazole (PROTONIX) 40 MG tablet, Take 1 tablet by mouth every morning (before breakfast), Disp: 90 tablet, Rfl: 1    vitamin C (ASCORBIC ACID) 500 MG tablet, Take 500 mg by mouth daily, Disp: , Rfl:     MULTIPLE VITAMIN PO, Take 2 tablets by mouth daily, Disp: , Rfl:     Zinc Sulfate (ZINC 15 PO), Take by mouth (Patient not taking: No sig reported), Disp: , Rfl:     Social History     Substance and Sexual Activity   Sexual Activity Yes    Partners: Male       Any bleeding or pain with intercourse: No    Last Yearly date:  2022    Last pap date and results: 2020 negative    Last HPV date and results: 2016 ? Last Mammogram date and results: 2022 negative    Last Dexa scan date and results: never    Last colorectal screen- type:colonoscopy*  date  2017 results negative    Do you do self breast exams: Yes    Past Medical History:   Diagnosis Date    Iron deficiency anemia     Ovarian cyst     Reflux gastritis        Past Surgical History:   Procedure Laterality Date     SECTION      x2    COLONOSCOPY      2005    CYST REMOVAL Right     ovary    [place] COLON CA SCRN NOT  [address] IND N/A 2017    -normal    UPPER GASTROINTESTINAL ENDOSCOPY  12-10-13       Family History   Problem Relation Age of Onset    High Blood Pressure Mother     High Blood Pressure Father     Other Father     Kidney Disease Maternal Grandmother     Stroke Maternal Grandfather     Heart Disease Maternal Grandfather     Cancer Paternal Grandmother     Heart Disease Paternal Grandfather     Other Other         [place] Blas. aunt breast cancer . No family h/o ovarian cancer or DVT. Chief Complaint   Patient presents with    Gynecologic Exam     Yearly exam. Last pap 2020 negative, HPV 2016 ? PE:  Vital Signs  Blood pressure 132/80, height 5' 4\" (1.626 m), weight 182 lb (82.6 kg), last menstrual period 2021, not currently breastfeeding. Estimated body mass index is 31.24 kg/m² as calculated from the following:    Height as of this encounter: 5' 4\" (1.626 m). Weight as of this encounter: 182 lb (82.6 kg). Labs:    No results found for this visit on 01/10/23.     No data recorded    NURSE: Celina YI    HPI: here for annual gyn exam, denies c/o    Review of Systems Constitutional: Negative. HENT:  Negative for congestion. Respiratory:  Negative for shortness of breath. Cardiovascular:  Negative for chest pain. Gastrointestinal:  Negative for abdominal pain. Genitourinary:  Negative for dysuria. Musculoskeletal:  Negative for back pain. Skin:  Negative for rash. Neurological:  Negative for headaches. Psychiatric/Behavioral:  The patient is not nervous/anxious. Objective  Lymphatic:   no lymphadenopathy  Heent:   negative   Cor: regular rate and rhythm, no murmurs              Pul:clear to auscultation bilaterally- no wheezes, rales or rhonchi, normal air movement, no respiratory distress      GI: Abdomen soft, non-tender. BS normal. No masses,  No organomegaly           Extremities: normal strength, tone, and muscle mass   Breasts: Breast:normal appearance, no masses or tenderness   Pelvic Exam: GENITAL/URINARY:  External Genitalia:  General appearance; normal, Hair distribution; normal, Lesions absent  Urethral Meatus:  Size normal, Location normal, Lesions absent, Prolapse absent  Urethra: Fullness absent, Masses absent  Bladder:  Fullness absent, Masses absent, Tenderness absent, Cystocele absent  Vagina:  General appearance normal, Estrogen effect normal, Discharge absent, Lesions absent, Pelvic support normal  Cervix:  General appearance normal, Lesions absent, Discharge absent, Tenderness absent, Enlargement absent, Nodularity absent  Uterus:  Size normal, Tenderness absent  Adenexa: Masses absent, Tenderness absent  Anus/Perineum:  Lesions absent and Masses absent                                                      Assessment and Plan          Diagnosis Orders   1. Encounter for annual routine gynecological examination        2.  Screening mammogram, encounter for  [facility] JESSICA DIGITAL SCREEN BILATERAL          has been >1 year without menses, reviewed danger sign of PMB      I am having Clyde Quiroz maintain her vitamin C, MULTIPLE VITAMIN PO, Zinc Sulfate (ZINC 15 PO), Probiotic Acidophilus, and pantoprazole. Return in about 1 year (around 1/10/2024) for yearly. She was also counseled on her preventative health maintenance recommendations and follow-up. There are no Patient Instructions on file for this visit.     Fern Orona, JUSTIN - MERY,1/10/2023 3:25 PM

## 2023-01-13 ASSESSMENT — ENCOUNTER SYMPTOMS
ABDOMINAL PAIN: 0
BACK PAIN: 0
SHORTNESS OF BREATH: 0

## 2023-03-22 ENCOUNTER — HOSPITAL ENCOUNTER (OUTPATIENT)
Dept: WOMENS IMAGING | Age: 58
Discharge: HOME | End: 2023-03-24
Payer: COMMERCIAL

## 2023-03-22 DIAGNOSIS — Z12.31 SCREENING MAMMOGRAM, ENCOUNTER FOR: ICD-10-CM

## 2023-03-22 PROCEDURE — 77063 BREAST TOMOSYNTHESIS BI: CPT

## 2023-04-18 ENCOUNTER — OFFICE VISIT (OUTPATIENT)
Dept: PRIMARY CARE CLINIC | Age: 58
End: 2023-04-18
Payer: COMMERCIAL

## 2023-04-18 VITALS
RESPIRATION RATE: 20 BRPM | WEIGHT: 180.3 LBS | TEMPERATURE: 97.7 F | OXYGEN SATURATION: 98 % | DIASTOLIC BLOOD PRESSURE: 74 MMHG | SYSTOLIC BLOOD PRESSURE: 122 MMHG | HEART RATE: 78 BPM | BODY MASS INDEX: 30.95 KG/M2

## 2023-04-18 DIAGNOSIS — J34.89 NASAL DRYNESS: ICD-10-CM

## 2023-04-18 DIAGNOSIS — K21.9 GASTROESOPHAGEAL REFLUX DISEASE WITHOUT ESOPHAGITIS: Primary | ICD-10-CM

## 2023-04-18 DIAGNOSIS — Z13.1 DIABETES MELLITUS SCREENING: ICD-10-CM

## 2023-04-18 DIAGNOSIS — Z13.220 LIPID SCREENING: ICD-10-CM

## 2023-04-18 PROCEDURE — 99214 OFFICE O/P EST MOD 30 MIN: CPT | Performed by: NURSE PRACTITIONER

## 2023-04-18 SDOH — ECONOMIC STABILITY: INCOME INSECURITY: HOW HARD IS IT FOR YOU TO PAY FOR THE VERY BASICS LIKE FOOD, HOUSING, MEDICAL CARE, AND HEATING?: PATIENT DECLINED

## 2023-04-18 SDOH — ECONOMIC STABILITY: FOOD INSECURITY: WITHIN THE PAST 12 MONTHS, YOU WORRIED THAT YOUR FOOD WOULD RUN OUT BEFORE YOU GOT MONEY TO BUY MORE.: PATIENT DECLINED

## 2023-04-18 SDOH — ECONOMIC STABILITY: HOUSING INSECURITY
IN THE LAST 12 MONTHS, WAS THERE A TIME WHEN YOU DID NOT HAVE A STEADY PLACE TO SLEEP OR SLEPT IN A SHELTER (INCLUDING NOW)?: PATIENT REFUSED

## 2023-04-18 SDOH — ECONOMIC STABILITY: FOOD INSECURITY: WITHIN THE PAST 12 MONTHS, THE FOOD YOU BOUGHT JUST DIDN'T LAST AND YOU DIDN'T HAVE MONEY TO GET MORE.: PATIENT DECLINED

## 2023-04-18 ASSESSMENT — ENCOUNTER SYMPTOMS
BELCHING: 0
RHINORRHEA: 0
VOMITING: 0
CONSTIPATION: 0
SHORTNESS OF BREATH: 0
SORE THROAT: 0
COUGH: 0
STRIDOR: 0
NAUSEA: 0
WATER BRASH: 0
ABDOMINAL PAIN: 0
DIARRHEA: 0
HEARTBURN: 1
WHEEZING: 0

## 2023-04-18 ASSESSMENT — PATIENT HEALTH QUESTIONNAIRE - PHQ9
SUM OF ALL RESPONSES TO PHQ9 QUESTIONS 1 & 2: 0
SUM OF ALL RESPONSES TO PHQ QUESTIONS 1-9: 0
SUM OF ALL RESPONSES TO PHQ QUESTIONS 1-9: 0
2. FEELING DOWN, DEPRESSED OR HOPELESS: 0
SUM OF ALL RESPONSES TO PHQ QUESTIONS 1-9: 0
1. LITTLE INTEREST OR PLEASURE IN DOING THINGS: 0
SUM OF ALL RESPONSES TO PHQ QUESTIONS 1-9: 0

## 2023-04-18 NOTE — PATIENT INSTRUCTIONS
SURVEY:     You may be receiving a survey from [employer] regarding your visit today. Please complete the survey to enable us to provide the highest quality of care to you and your family. If you cannot score us a very good on any question, please call the office to discuss how we could have made your experience a very good one.      Thank you,    Vinita Williamson, APRN-CNP  Srinivas Gallo, APRN-CNP  Latasha Donnelly, DK Arnold, CMA  Amanda Fernando, CMA  Malathi, CMA  Kanwal, PCA  Heather, PM

## 2023-04-18 NOTE — PROGRESS NOTES
wheezing. Cardiovascular:  Negative for chest pain and palpitations. Gastrointestinal:  Positive for heartburn. Negative for abdominal pain, constipation, diarrhea, melena, nausea and vomiting. Genitourinary:  Negative for difficulty urinating and dysuria. Musculoskeletal:  Negative for gait problem, neck pain and neck stiffness. Skin:  Negative for rash. Neurological:  Negative for dizziness, syncope, light-headedness and headaches. Physical Exam:   Vitals:  /74 (Position: Sitting)   Pulse 78   Temp 97.7 °F (36.5 °C) (Temporal)   Resp 20   Wt 180 lb 4.8 oz (81.8 kg)   LMP 11/09/2021 (Approximate)   SpO2 98%   BMI 30.95 kg/m²     Physical Exam  Vitals and nursing note reviewed. Constitutional:       General: She is not in acute distress. Appearance: Normal appearance. She is obese. She is not ill-appearing. HENT:      Nose:      Left Nostril: No epistaxis. Comments: Mild dryness left nasal mucosa, scab noted     Mouth/Throat:      Mouth: Mucous membranes are moist.      Pharynx: Oropharynx is clear. Eyes:      General: No scleral icterus. Conjunctiva/sclera: Conjunctivae normal.   Cardiovascular:      Rate and Rhythm: Normal rate and regular rhythm. Heart sounds: No murmur heard. Pulmonary:      Effort: Pulmonary effort is normal.      Breath sounds: Normal breath sounds. No wheezing. Abdominal:      General: Bowel sounds are normal. There is no distension. Palpations: Abdomen is soft. Tenderness: There is no abdominal tenderness. There is no guarding or rebound. Musculoskeletal:         General: Normal range of motion. Cervical back: Normal range of motion and neck supple. Skin:     General: Skin is warm and dry. Findings: No rash. Neurological:      Mental Status: She is alert and oriented to person, place, and time.    Psychiatric:         Mood and Affect: Mood normal.         Behavior: Behavior normal.       Data:     Lab Results

## 2023-05-21 LAB
CHOLESTEROL/HDL RATIO: 3.1 RATIO
CHOLESTEROL: 193 MG/DL
GLUCOSE: 93 MG/DL (ref 70–99)
HDLC SERPL-MCNC: 62 MG/DL
LDL CHOLESTEROL CALCULATED: 115 MG/DL
LDL/HDL RATIO: 1.9 RATIO
TRIGL SERPL-MCNC: 78 MG/DL
VLDLC SERPL CALC-MCNC: 16 MG/DL

## 2023-05-22 ENCOUNTER — TELEPHONE (OUTPATIENT)
Dept: PRIMARY CARE CLINIC | Age: 58
End: 2023-05-22

## 2023-05-22 NOTE — TELEPHONE ENCOUNTER
----- Message from JUSTIN Del Valle CNP sent at 5/22/2023  8:36 AM EDT -----  Please notify patient of normal lab results.   Thanks Mabel Cornejo

## 2024-01-16 ENCOUNTER — HOSPITAL ENCOUNTER (OUTPATIENT)
Age: 59
Setting detail: SPECIMEN
Discharge: HOME | End: 2024-01-16
Payer: COMMERCIAL

## 2024-01-16 ENCOUNTER — OFFICE VISIT (OUTPATIENT)
Dept: OBGYN | Age: 59
End: 2024-01-16
Payer: COMMERCIAL

## 2024-01-16 VITALS
WEIGHT: 182 LBS | BODY MASS INDEX: 31.07 KG/M2 | HEIGHT: 64 IN | DIASTOLIC BLOOD PRESSURE: 82 MMHG | SYSTOLIC BLOOD PRESSURE: 128 MMHG

## 2024-01-16 DIAGNOSIS — Z12.4 SCREENING FOR MALIGNANT NEOPLASM OF CERVIX: ICD-10-CM

## 2024-01-16 DIAGNOSIS — Z12.31 SCREENING MAMMOGRAM, ENCOUNTER FOR: ICD-10-CM

## 2024-01-16 DIAGNOSIS — Z01.419 ENCOUNTER FOR ANNUAL ROUTINE GYNECOLOGICAL EXAMINATION: Primary | ICD-10-CM

## 2024-01-16 PROCEDURE — G0145 SCR C/V CYTO,THINLAYER,RESCR: HCPCS

## 2024-01-16 PROCEDURE — 99396 PREV VISIT EST AGE 40-64: CPT | Performed by: ADVANCED PRACTICE MIDWIFE

## 2024-01-16 ASSESSMENT — ENCOUNTER SYMPTOMS
BACK PAIN: 0
SHORTNESS OF BREATH: 0
ABDOMINAL PAIN: 0

## 2024-01-16 NOTE — PROGRESS NOTES
YEARLY PHYSICAL    Date of service: 2024    Judi Elizalde  Is a 59 y.o.  , female    PT's PCP is: Amol Williamson, APRN - CNP     : 1965                                             Subjective:       Patient's last menstrual period was 2021 (approximate).     Are your menses regular: not applicable    OB History    Para Term  AB Living   4 2 2   2 2   SAB IAB Ectopic Molar Multiple Live Births   2                # Outcome Date GA Lbr Roger/2nd Weight Sex Delivery Anes PTL Lv   4 Term            3 Term            2 SAB            1 SAB                 Social History     Tobacco Use   Smoking Status Never   Smokeless Tobacco Never        Social History     Substance and Sexual Activity   Alcohol Use Yes    Comment: I may have a beer on few occasions during the week       Family History   Problem Relation Age of Onset    High Blood Pressure Mother     High Blood Pressure Father     Other Father     Kidney Disease Maternal Grandmother     Stroke Maternal Grandfather     Heart Disease Maternal Grandfather     Cancer Paternal Grandmother     Heart Disease Paternal Grandfather     Other Other         Pat. aunt breast cancer .No family h/o ovarian cancer or DVT.        Any family history of breast or ovarian cancer: Yes pat, aunt breast    Any family history of blood clots: No    Have you had a positive covid test: Yes    Have you had the covid immunization: No      Allergies: Penicillins, Vancomycin, and Wellbutrin [bupropion]      Current Outpatient Medications:     Probiotic Acidophilus (FLORANEX) TABS, Take 1 tablet by mouth daily, Disp: , Rfl:     vitamin C (ASCORBIC ACID) 500 MG tablet, Take 1 tablet by mouth daily, Disp: , Rfl:     MULTIPLE VITAMIN PO, Take 2 tablets by mouth daily, Disp: , Rfl:     Social History     Substance and Sexual Activity   Sexual Activity Yes    Partners: Male       Any bleeding or

## 2024-02-05 LAB — CYTOLOGY REPORT: NORMAL

## 2024-04-04 ENCOUNTER — HOSPITAL ENCOUNTER (OUTPATIENT)
Dept: WOMENS IMAGING | Age: 59
Discharge: HOME | End: 2024-04-06
Attending: ADVANCED PRACTICE MIDWIFE
Payer: COMMERCIAL

## 2024-04-04 DIAGNOSIS — Z12.31 SCREENING MAMMOGRAM, ENCOUNTER FOR: ICD-10-CM

## 2024-04-04 PROCEDURE — 77063 BREAST TOMOSYNTHESIS BI: CPT

## 2024-04-12 ENCOUNTER — TELEPHONE (OUTPATIENT)
Dept: PRIMARY CARE CLINIC | Age: 59
End: 2024-04-12

## 2024-04-12 DIAGNOSIS — Z13.1 DIABETES MELLITUS SCREENING: Primary | ICD-10-CM

## 2024-04-12 DIAGNOSIS — Z13.220 LIPID SCREENING: ICD-10-CM

## 2024-04-12 DIAGNOSIS — Z00.00 WELLNESS EXAMINATION: ICD-10-CM

## 2024-04-12 NOTE — TELEPHONE ENCOUNTER
Judi came to office, has 1 year appt 4/19.  Does she need to have labs prior to the appt?  If so, please order thank you

## 2024-04-16 ENCOUNTER — HOSPITAL ENCOUNTER (OUTPATIENT)
Age: 59
Setting detail: SPECIMEN
Discharge: HOME | End: 2024-04-16

## 2024-04-16 DIAGNOSIS — Z13.1 DIABETES MELLITUS SCREENING: ICD-10-CM

## 2024-04-16 DIAGNOSIS — Z13.220 LIPID SCREENING: ICD-10-CM

## 2024-04-16 DIAGNOSIS — Z00.00 WELLNESS EXAMINATION: ICD-10-CM

## 2024-04-16 LAB
ALT SERPL-CCNC: 25 U/L (ref 10–35)
ANION GAP SERPL CALCULATED.3IONS-SCNC: 9 MMOL/L (ref 9–16)
AST SERPL-CCNC: 28 U/L (ref 10–35)
BASOPHILS # BLD: 0.05 K/UL (ref 0–0.2)
BASOPHILS NFR BLD: 1 % (ref 0–2)
BUN SERPL-MCNC: 19 MG/DL (ref 6–20)
CALCIUM SERPL-MCNC: 9.3 MG/DL (ref 8.6–10.4)
CHLORIDE SERPL-SCNC: 107 MMOL/L (ref 98–107)
CHOLEST SERPL-MCNC: 174 MG/DL (ref 0–199)
CHOLESTEROL/HDL RATIO: 3
CO2 SERPL-SCNC: 25 MMOL/L (ref 20–31)
CREAT SERPL-MCNC: 0.9 MG/DL (ref 0.5–0.9)
EOSINOPHIL # BLD: 0.17 K/UL (ref 0–0.44)
EOSINOPHILS RELATIVE PERCENT: 4 % (ref 1–4)
ERYTHROCYTE [DISTWIDTH] IN BLOOD BY AUTOMATED COUNT: 12.7 % (ref 11.8–14.4)
GFR SERPL CREATININE-BSD FRML MDRD: 78 ML/MIN/1.73M2
GLUCOSE SERPL-MCNC: 95 MG/DL (ref 74–99)
HCT VFR BLD AUTO: 44.1 % (ref 36.3–47.1)
HDLC SERPL-MCNC: 57 MG/DL
HGB BLD-MCNC: 14.8 G/DL (ref 11.9–15.1)
IMM GRANULOCYTES # BLD AUTO: <0.03 K/UL (ref 0–0.3)
IMM GRANULOCYTES NFR BLD: 0 %
LDLC SERPL CALC-MCNC: 101 MG/DL (ref 0–100)
LYMPHOCYTES NFR BLD: 1.56 K/UL (ref 1.1–3.7)
LYMPHOCYTES RELATIVE PERCENT: 36 % (ref 24–43)
MCH RBC QN AUTO: 30.6 PG (ref 25.2–33.5)
MCHC RBC AUTO-ENTMCNC: 33.6 G/DL (ref 28.4–34.8)
MCV RBC AUTO: 91.3 FL (ref 82.6–102.9)
MONOCYTES NFR BLD: 0.4 K/UL (ref 0.1–1.2)
MONOCYTES NFR BLD: 9 % (ref 3–12)
NEUTROPHILS NFR BLD: 50 % (ref 36–65)
NEUTS SEG NFR BLD: 2.2 K/UL (ref 1.5–8.1)
NRBC BLD-RTO: 0 PER 100 WBC
PLATELET # BLD AUTO: 133 K/UL (ref 138–453)
PMV BLD AUTO: 10.3 FL (ref 8.1–13.5)
POTASSIUM SERPL-SCNC: 4.3 MMOL/L (ref 3.7–5.3)
RBC # BLD AUTO: 4.83 M/UL (ref 3.95–5.11)
SODIUM SERPL-SCNC: 141 MMOL/L (ref 136–145)
TRIGL SERPL-MCNC: 83 MG/DL
VLDLC SERPL CALC-MCNC: 17 MG/DL
WBC OTHER # BLD: 4.4 K/UL (ref 3.5–11.3)

## 2024-04-17 ASSESSMENT — PATIENT HEALTH QUESTIONNAIRE - PHQ9
SUM OF ALL RESPONSES TO PHQ9 QUESTIONS 1 & 2: 0
SUM OF ALL RESPONSES TO PHQ QUESTIONS 1-9: 0
SUM OF ALL RESPONSES TO PHQ QUESTIONS 1-9: 0
SUM OF ALL RESPONSES TO PHQ9 QUESTIONS 1 & 2: 0
1. LITTLE INTEREST OR PLEASURE IN DOING THINGS: NOT AT ALL
SUM OF ALL RESPONSES TO PHQ QUESTIONS 1-9: 0
2. FEELING DOWN, DEPRESSED OR HOPELESS: NOT AT ALL
2. FEELING DOWN, DEPRESSED OR HOPELESS: NOT AT ALL
SUM OF ALL RESPONSES TO PHQ QUESTIONS 1-9: 0
1. LITTLE INTEREST OR PLEASURE IN DOING THINGS: NOT AT ALL

## 2024-04-19 ENCOUNTER — OFFICE VISIT (OUTPATIENT)
Dept: PRIMARY CARE CLINIC | Age: 59
End: 2024-04-19
Payer: COMMERCIAL

## 2024-04-19 VITALS
WEIGHT: 183.6 LBS | OXYGEN SATURATION: 96 % | HEART RATE: 81 BPM | RESPIRATION RATE: 18 BRPM | SYSTOLIC BLOOD PRESSURE: 114 MMHG | HEIGHT: 64 IN | DIASTOLIC BLOOD PRESSURE: 66 MMHG | TEMPERATURE: 97.7 F | BODY MASS INDEX: 31.34 KG/M2

## 2024-04-19 DIAGNOSIS — D69.6 THROMBOCYTOPENIA, UNSPECIFIED (HCC): Primary | ICD-10-CM

## 2024-04-19 DIAGNOSIS — Z00.00 WELLNESS EXAMINATION: ICD-10-CM

## 2024-04-19 PROBLEM — D50.9 IRON DEFICIENCY ANEMIA: Status: RESOLVED | Noted: 2017-07-20 | Resolved: 2024-04-19

## 2024-04-19 PROBLEM — K90.9 MALABSORPTION: Status: RESOLVED | Noted: 2017-07-20 | Resolved: 2024-04-19

## 2024-04-19 PROCEDURE — 99396 PREV VISIT EST AGE 40-64: CPT | Performed by: NURSE PRACTITIONER

## 2024-04-19 SDOH — ECONOMIC STABILITY: FOOD INSECURITY: WITHIN THE PAST 12 MONTHS, YOU WORRIED THAT YOUR FOOD WOULD RUN OUT BEFORE YOU GOT MONEY TO BUY MORE.: NEVER TRUE

## 2024-04-19 SDOH — ECONOMIC STABILITY: HOUSING INSECURITY
IN THE LAST 12 MONTHS, WAS THERE A TIME WHEN YOU DID NOT HAVE A STEADY PLACE TO SLEEP OR SLEPT IN A SHELTER (INCLUDING NOW)?: NO

## 2024-04-19 SDOH — ECONOMIC STABILITY: INCOME INSECURITY: HOW HARD IS IT FOR YOU TO PAY FOR THE VERY BASICS LIKE FOOD, HOUSING, MEDICAL CARE, AND HEATING?: NOT HARD AT ALL

## 2024-04-19 SDOH — ECONOMIC STABILITY: FOOD INSECURITY: WITHIN THE PAST 12 MONTHS, THE FOOD YOU BOUGHT JUST DIDN'T LAST AND YOU DIDN'T HAVE MONEY TO GET MORE.: NEVER TRUE

## 2024-04-19 NOTE — PATIENT INSTRUCTIONS
SURVEY:     You may be receiving a survey from [facility] regarding your visit today.     Please complete the survey to enable us to provide the highest quality of care to you and your family.     If you cannot score us a very good on any question, please call the office to discuss how we could have made your experience a very good one.     Thank you,    Amol Williamson, APRN-CNP  Whit Keller, APRN-CNP  Carrie, LPN  Aleksandra, CMA  Per, CMA  Malathi, CMA  Kanwal, PCA  Carleen, CMA  Heather, PM

## 2024-04-19 NOTE — PROGRESS NOTES
Rate and Rhythm: Normal rate and regular rhythm.      Heart sounds: No murmur heard.  Pulmonary:      Effort: Pulmonary effort is normal.      Breath sounds: Normal breath sounds. No wheezing.   Abdominal:      General: Bowel sounds are normal. There is no distension.      Palpations: Abdomen is soft.      Tenderness: There is no abdominal tenderness.   Musculoskeletal:      Cervical back: Normal range of motion and neck supple.      Right lower leg: No edema.      Left lower leg: No edema.   Lymphadenopathy:      Cervical: No cervical adenopathy.   Skin:     General: Skin is warm and dry.   Neurological:      Mental Status: She is alert and oriented to person, place, and time.   Psychiatric:         Mood and Affect: Mood normal.         Behavior: Behavior normal.         Data:     Lab Results   Component Value Date/Time     04/16/2024 08:05 AM    K 4.3 04/16/2024 08:05 AM     04/16/2024 08:05 AM    CO2 25 04/16/2024 08:05 AM    BUN 19 04/16/2024 08:05 AM    CREATININE 0.9 04/16/2024 08:05 AM    GLUCOSE 95 04/16/2024 08:05 AM    GLUCOSE 93 05/20/2023 09:45 AM    PROT 7.4 01/25/2020 09:25 AM    BILITOT 0.8 01/25/2020 09:25 AM    BILITOT NEGATIVE 08/31/2017 08:32 AM    ALKPHOS 73 01/25/2020 09:25 AM    ALKPHOS 65 08/27/2013 07:48 AM    AST 28 04/16/2024 08:05 AM    ALT 25 04/16/2024 08:05 AM     Lab Results   Component Value Date/Time    WBC 4.4 04/16/2024 08:05 AM    RBC 4.83 04/16/2024 08:05 AM    RBC 4.68 01/25/2020 09:25 AM    HGB 14.8 04/16/2024 08:05 AM    HCT 44.1 04/16/2024 08:05 AM    MCV 91.3 04/16/2024 08:05 AM    MCH 30.6 04/16/2024 08:05 AM    MCHC 33.6 04/16/2024 08:05 AM    RDW 12.7 04/16/2024 08:05 AM     04/16/2024 08:05 AM    MPV 10.3 04/16/2024 08:05 AM     Lab Results   Component Value Date/Time    TSH 2.72 11/16/2020 02:45 PM     Lab Results   Component Value Date/Time    CHOL 174 04/16/2024 08:05 AM    HDL 57 04/16/2024 08:05 AM       Assessment/Plan:      Diagnosis Orders

## 2024-04-22 ASSESSMENT — ENCOUNTER SYMPTOMS
ABDOMINAL PAIN: 0
CONSTIPATION: 0
VOMITING: 0
COUGH: 0
SORE THROAT: 0
RHINORRHEA: 0
NAUSEA: 0
SHORTNESS OF BREATH: 0
DIARRHEA: 0
WHEEZING: 0

## 2024-05-21 ENCOUNTER — HOSPITAL ENCOUNTER (OUTPATIENT)
Age: 59
Setting detail: SPECIMEN
Discharge: HOME | End: 2024-05-21

## 2024-05-21 DIAGNOSIS — D69.6 THROMBOCYTOPENIA, UNSPECIFIED (HCC): ICD-10-CM

## 2024-05-21 LAB
BASOPHILS # BLD: 0.05 K/UL (ref 0–0.2)
BASOPHILS NFR BLD: 1 % (ref 0–2)
EOSINOPHIL # BLD: 0.14 K/UL (ref 0–0.44)
EOSINOPHILS RELATIVE PERCENT: 3 % (ref 1–4)
ERYTHROCYTE [DISTWIDTH] IN BLOOD BY AUTOMATED COUNT: 12.7 % (ref 11.8–14.4)
HCT VFR BLD AUTO: 41.5 % (ref 36.3–47.1)
HGB BLD-MCNC: 13.6 G/DL (ref 11.9–15.1)
IMM GRANULOCYTES # BLD AUTO: <0.03 K/UL (ref 0–0.3)
IMM GRANULOCYTES NFR BLD: 0 %
LYMPHOCYTES NFR BLD: 1.71 K/UL (ref 1.1–3.7)
LYMPHOCYTES RELATIVE PERCENT: 30 % (ref 24–43)
MCH RBC QN AUTO: 30.5 PG (ref 25.2–33.5)
MCHC RBC AUTO-ENTMCNC: 32.8 G/DL (ref 28.4–34.8)
MCV RBC AUTO: 93 FL (ref 82.6–102.9)
MONOCYTES NFR BLD: 0.4 K/UL (ref 0.1–1.2)
MONOCYTES NFR BLD: 7 % (ref 3–12)
NEUTROPHILS NFR BLD: 59 % (ref 36–65)
NEUTS SEG NFR BLD: 3.33 K/UL (ref 1.5–8.1)
NRBC BLD-RTO: 0 PER 100 WBC
PLATELET # BLD AUTO: 143 K/UL (ref 138–453)
PMV BLD AUTO: 10.3 FL (ref 8.1–13.5)
RBC # BLD AUTO: 4.46 M/UL (ref 3.95–5.11)
WBC OTHER # BLD: 5.6 K/UL (ref 3.5–11.3)

## 2024-05-22 ENCOUNTER — TELEPHONE (OUTPATIENT)
Dept: PRIMARY CARE CLINIC | Age: 59
End: 2024-05-22

## 2024-05-22 NOTE — TELEPHONE ENCOUNTER
----- Message from JUSTIN Silva CNP sent at 5/21/2024  9:41 PM EDT -----  Results are normal, platelets are normal.  Please call patient and make them aware.

## 2024-09-20 ENCOUNTER — TELEPHONE (OUTPATIENT)
Dept: OBGYN | Age: 59
End: 2024-09-20

## 2024-09-20 ENCOUNTER — HOSPITAL ENCOUNTER (EMERGENCY)
Age: 59
Discharge: HOME | End: 2024-09-20
Payer: COMMERCIAL

## 2024-09-20 VITALS
DIASTOLIC BLOOD PRESSURE: 83 MMHG | OXYGEN SATURATION: 96 % | HEART RATE: 82 BPM | RESPIRATION RATE: 16 BRPM | TEMPERATURE: 97.9 F | SYSTOLIC BLOOD PRESSURE: 142 MMHG

## 2024-09-20 DIAGNOSIS — N95.0 POST-MENOPAUSE BLEEDING: Primary | ICD-10-CM

## 2024-09-20 LAB
ALBUMIN SERPL-MCNC: 4.1 G/DL (ref 3.5–5.2)
ALBUMIN/GLOB SERPL: 1.4 {RATIO} (ref 1–2.5)
ALP SERPL-CCNC: 99 U/L (ref 35–104)
ALT SERPL-CCNC: 29 U/L (ref 10–35)
ANION GAP SERPL CALCULATED.3IONS-SCNC: 10 MMOL/L (ref 9–16)
AST SERPL-CCNC: 28 U/L (ref 10–35)
BACTERIA URNS QL MICRO: ABNORMAL
BASOPHILS # BLD: 0.04 K/UL (ref 0–0.2)
BASOPHILS NFR BLD: 1 % (ref 0–2)
BILIRUB SERPL-MCNC: 0.7 MG/DL (ref 0–1.2)
BILIRUB UR QL STRIP: NEGATIVE
BUN SERPL-MCNC: 18 MG/DL (ref 6–20)
BUN/CREAT SERPL: 20 (ref 9–20)
CALCIUM SERPL-MCNC: 9.7 MG/DL (ref 8.6–10.4)
CHLORIDE SERPL-SCNC: 103 MMOL/L (ref 98–107)
CLARITY UR: CLEAR
CO2 SERPL-SCNC: 24 MMOL/L (ref 20–31)
COLOR UR: YELLOW
CREAT SERPL-MCNC: 0.9 MG/DL (ref 0.5–0.9)
EOSINOPHIL # BLD: 0.12 K/UL (ref 0–0.44)
EOSINOPHILS RELATIVE PERCENT: 2 % (ref 1–4)
EPI CELLS #/AREA URNS HPF: ABNORMAL /HPF (ref 0–25)
ERYTHROCYTE [DISTWIDTH] IN BLOOD BY AUTOMATED COUNT: 12.3 % (ref 11.8–14.4)
GFR, ESTIMATED: 78 ML/MIN/1.73M2
GLUCOSE SERPL-MCNC: 113 MG/DL (ref 74–99)
GLUCOSE UR STRIP-MCNC: NEGATIVE MG/DL
HCG UR QL: NEGATIVE
HCT VFR BLD AUTO: 43.5 % (ref 36.3–47.1)
HGB BLD-MCNC: 15.1 G/DL (ref 11.9–15.1)
HGB UR QL STRIP.AUTO: ABNORMAL
IMM GRANULOCYTES # BLD AUTO: <0.03 K/UL (ref 0–0.3)
IMM GRANULOCYTES NFR BLD: 0 %
KETONES UR STRIP-MCNC: NEGATIVE MG/DL
LACTATE BLDV-SCNC: 1.4 MMOL/L (ref 0.5–2.2)
LEUKOCYTE ESTERASE UR QL STRIP: NEGATIVE
LIPASE SERPL-CCNC: 24 U/L (ref 13–60)
LYMPHOCYTES NFR BLD: 1.17 K/UL (ref 1.1–3.7)
LYMPHOCYTES RELATIVE PERCENT: 23 % (ref 24–43)
MCH RBC QN AUTO: 30.8 PG (ref 25.2–33.5)
MCHC RBC AUTO-ENTMCNC: 34.7 G/DL (ref 28.4–34.8)
MCV RBC AUTO: 88.6 FL (ref 82.6–102.9)
MONOCYTES NFR BLD: 0.48 K/UL (ref 0.1–1.2)
MONOCYTES NFR BLD: 9 % (ref 3–12)
NEUTROPHILS NFR BLD: 65 % (ref 36–65)
NEUTS SEG NFR BLD: 3.38 K/UL (ref 1.5–8.1)
NITRITE UR QL STRIP: NEGATIVE
NRBC BLD-RTO: 0 PER 100 WBC
PH UR STRIP: 6 [PH] (ref 5–9)
PLATELET # BLD AUTO: ABNORMAL K/UL (ref 138–453)
PLATELET, FLUORESCENCE: 137 K/UL (ref 138–453)
PLATELETS.RETICULATED NFR BLD AUTO: 1.5 % (ref 1.1–10.3)
POTASSIUM SERPL-SCNC: 4.3 MMOL/L (ref 3.7–5.3)
PROT SERPL-MCNC: 7.1 G/DL (ref 6.6–8.7)
PROT UR STRIP-MCNC: NEGATIVE MG/DL
RBC # BLD AUTO: 4.91 M/UL (ref 3.95–5.11)
RBC #/AREA URNS HPF: ABNORMAL /HPF (ref 0–2)
SODIUM SERPL-SCNC: 137 MMOL/L (ref 136–145)
SP GR UR STRIP: <1.005 (ref 1.01–1.02)
UROBILINOGEN UR STRIP-ACNC: NORMAL EU/DL (ref 0–1)
WBC #/AREA URNS HPF: ABNORMAL /HPF (ref 0–5)
WBC OTHER # BLD: 5.2 K/UL (ref 3.5–11.3)

## 2024-09-20 PROCEDURE — 81025 URINE PREGNANCY TEST: CPT

## 2024-09-20 PROCEDURE — 83605 ASSAY OF LACTIC ACID: CPT

## 2024-09-20 PROCEDURE — 80053 COMPREHEN METABOLIC PANEL: CPT

## 2024-09-20 PROCEDURE — 99283 EMERGENCY DEPT VISIT LOW MDM: CPT

## 2024-09-20 PROCEDURE — 81001 URINALYSIS AUTO W/SCOPE: CPT

## 2024-09-20 PROCEDURE — 85025 COMPLETE CBC W/AUTO DIFF WBC: CPT

## 2024-09-20 PROCEDURE — 83690 ASSAY OF LIPASE: CPT

## 2024-09-20 ASSESSMENT — PAIN DESCRIPTION - FREQUENCY: FREQUENCY: CONTINUOUS

## 2024-09-20 ASSESSMENT — PAIN DESCRIPTION - DESCRIPTORS: DESCRIPTORS: DISCOMFORT

## 2024-09-20 ASSESSMENT — PAIN DESCRIPTION - ORIENTATION: ORIENTATION: RIGHT

## 2024-09-20 ASSESSMENT — PAIN DESCRIPTION - LOCATION: LOCATION: ABDOMEN

## 2024-09-20 ASSESSMENT — PAIN DESCRIPTION - PAIN TYPE: TYPE: ACUTE PAIN

## 2024-09-20 ASSESSMENT — PAIN - FUNCTIONAL ASSESSMENT: PAIN_FUNCTIONAL_ASSESSMENT: 0-10

## 2024-09-24 ENCOUNTER — HOSPITAL ENCOUNTER (OUTPATIENT)
Age: 59
Setting detail: SPECIMEN
Discharge: HOME | End: 2024-09-24
Payer: COMMERCIAL

## 2024-09-24 ENCOUNTER — TELEPHONE (OUTPATIENT)
Dept: PRIMARY CARE CLINIC | Age: 59
End: 2024-09-24

## 2024-09-24 ENCOUNTER — PROCEDURE VISIT (OUTPATIENT)
Dept: OBGYN | Age: 59
End: 2024-09-24

## 2024-09-24 VITALS
BODY MASS INDEX: 31.76 KG/M2 | WEIGHT: 186 LBS | HEIGHT: 64 IN | DIASTOLIC BLOOD PRESSURE: 82 MMHG | SYSTOLIC BLOOD PRESSURE: 132 MMHG

## 2024-09-24 DIAGNOSIS — N95.0 POSTMENOPAUSAL BLEEDING: ICD-10-CM

## 2024-09-24 DIAGNOSIS — N95.0 POSTMENOPAUSAL BLEEDING: Primary | ICD-10-CM

## 2024-09-24 LAB — HGB, POC: 12.6 G/DL

## 2024-09-24 PROCEDURE — 88305 TISSUE EXAM BY PATHOLOGIST: CPT

## 2024-09-25 LAB — SURGICAL PATHOLOGY REPORT: NORMAL

## 2024-10-01 ENCOUNTER — OFFICE VISIT (OUTPATIENT)
Dept: OBGYN | Age: 59
End: 2024-10-01
Payer: COMMERCIAL

## 2024-10-01 VITALS
BODY MASS INDEX: 31.76 KG/M2 | WEIGHT: 186 LBS | HEIGHT: 64 IN | SYSTOLIC BLOOD PRESSURE: 116 MMHG | DIASTOLIC BLOOD PRESSURE: 84 MMHG

## 2024-10-01 DIAGNOSIS — N95.0 POSTMENOPAUSAL BLEEDING: Primary | ICD-10-CM

## 2024-10-01 DIAGNOSIS — R93.89 ENDOMETRIAL THICKENING ON ULTRASOUND: ICD-10-CM

## 2024-10-01 PROCEDURE — 99213 OFFICE O/P EST LOW 20 MIN: CPT | Performed by: OBSTETRICS & GYNECOLOGY

## 2024-10-01 NOTE — H&P (VIEW-ONLY)
and follow-up reviewed with her at the completion of her visit.      ASSESSMENT:      1. Postmenopausal bleeding    2. Endometrial thickening on ultrasound        PLAN:  No orders of the defined types were placed in this encounter.    Return for BA to coordinate.       Electronically signed by Carmita Bradley DO on 10/01/24

## 2024-10-02 ENCOUNTER — TELEPHONE (OUTPATIENT)
Dept: OBGYN CLINIC | Age: 59
End: 2024-10-02

## 2024-10-02 NOTE — PROGRESS NOTES
I attempted to call patient and had to leave a message.  
I spoke to patient and reviewed surgery expectations and recovery.  She is penciled in for a Hysteroscopy D&C at [facility] on 10/7/24.  She is aware that a nurse from [facility] will call her to complete a phone interview and arrange COVID testing if needed.  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  Patient will sign consents and get labs on admission.  We will also follow up 2-4 weeks after surgery.  Appointments scheduled.  Patient verbalized understanding, no further questions/concerns voiced  
(4/19/2024)    Hunger Vital Sign     Worried About Running Out of Food in the Last Year: Never true     Ran Out of Food in the Last Year: Never true   Transportation Needs: Unknown (4/19/2024)    PRAPARE - Transportation     Lack of Transportation (Non-Medical): No   Housing Stability: Unknown (4/19/2024)    Housing Stability Vital Sign     Unstable Housing in the Last Year: No       REVIEW OF SYSTEMS:  Review of Systems   Constitutional:  Negative for chills and fever.   Genitourinary:  Positive for vaginal bleeding (7 days). Negative for dysuria and pelvic pain.       PHYSICAL EXAM:  /84   Ht 1.626 m (5' 4\")   Wt 84.4 kg (186 lb)   LMP 11/09/2022 (Approximate)   BMI 31.93 kg/m²   Physical Exam  Constitutional:       Appearance: Normal appearance.   HENT:      Head: Normocephalic and atraumatic.   Eyes:      Extraocular Movements: Extraocular movements intact.      Pupils: Pupils are equal, round, and reactive to light.   Cardiovascular:      Rate and Rhythm: Normal rate.   Pulmonary:      Effort: Pulmonary effort is normal.   Musculoskeletal:         General: Normal range of motion.   Neurological:      Mental Status: She is alert and oriented to person, place, and time.   Skin:     General: Skin is warm and dry.   Psychiatric:         Mood and Affect: Mood normal.         Behavior: Behavior normal.     The patient, Judi Elizalde is a 59 y.o. female, was seen with a total time spent of 20 minutes for the visit on this date of service by the E/M provider. The time component had both face to face and non face to face time spent in determining the total time component.  Counseling and education regarding her diagnosis listed below and her options regarding those diagnoses were also included in determining her time component.      Diagnosis Orders   1. Postmenopausal bleeding        2. Endometrial thickening on ultrasound             The patient had her preventative health maintenance recommendations

## 2024-10-02 NOTE — TELEPHONE ENCOUNTER
Judi CRUM Schleter     1965        female    [address]  [place] [address]         [id number]           Legal Guardian NO  If yes, Name:       Skilled Facility NO     If yes, Name:                                             Home Phone: [phone]         Cell Phone:    Telephone Information:   Mobile [phone]                                           Surgeon: Tyra Surgery Date: 10/7/2024                    Time: as indicated    Procedure: Dilation and Curettage, Hysteroscopy  Duration: 30 minutes    Diagnosis: postmenopausal bleeding, endometrial hyperplasia   CPT Codes:05340    Important Medical History:  In Epic    First Assistant NA  Special Inst/Equip/Implants: Regular    Nickel allergy  NO  Latex Allergy: NO      Cardiac Device:  No  If yes, need most recent pacemaker interrogation from Cardiologist:  Type of pacemaker:    Anesthesia:    MAC                       Admission Type:  Same Day                        Admit Prior to Day of Surgery: No    Case Location:  Main OR            Preadmission Testing:  Phone Call             PAT Date and Time: as indicated    Need Preop Cardiac Clearance: NO  Need Pre-op/Medical Clearance:NO    Does Patient have Cardiologist/physician? Name of Physician:    JOSÉ LUIS    Special Needs Communication:  Hugo Lift NO    needed NO

## 2024-10-03 NOTE — PROGRESS NOTES
Patient instructed on the pre-operative, intra-operative, and post-operative process. Patient instructed on NPO status. Medication instructions and pre operative instruction sheet reviewed with the patient. CHG skin prep instructions reviewed with patient. Patient instructed to stop her vitamins and minerals, may resume after procedure.

## 2024-10-04 ENCOUNTER — ANESTHESIA EVENT (OUTPATIENT)
Dept: OPERATING ROOM | Age: 59
End: 2024-10-04
Payer: COMMERCIAL

## 2024-10-07 ENCOUNTER — HOSPITAL ENCOUNTER (OUTPATIENT)
Age: 59
Setting detail: OUTPATIENT SURGERY
Discharge: HOME | End: 2024-10-07
Attending: OBSTETRICS & GYNECOLOGY | Admitting: OBSTETRICS & GYNECOLOGY
Payer: COMMERCIAL

## 2024-10-07 ENCOUNTER — ANESTHESIA (OUTPATIENT)
Dept: OPERATING ROOM | Age: 59
End: 2024-10-07
Payer: COMMERCIAL

## 2024-10-07 VITALS
RESPIRATION RATE: 16 BRPM | HEIGHT: 67 IN | HEART RATE: 56 BPM | SYSTOLIC BLOOD PRESSURE: 95 MMHG | DIASTOLIC BLOOD PRESSURE: 57 MMHG | OXYGEN SATURATION: 98 % | WEIGHT: 179 LBS | BODY MASS INDEX: 28.09 KG/M2 | TEMPERATURE: 97.2 F

## 2024-10-07 DIAGNOSIS — N95.0 POSTMENOPAUSAL BLEEDING: ICD-10-CM

## 2024-10-07 PROBLEM — G89.18 POST-OP PAIN: Status: ACTIVE | Noted: 2024-10-07

## 2024-10-07 PROCEDURE — 7100000010 HC PHASE II RECOVERY - FIRST 15 MIN: Performed by: OBSTETRICS & GYNECOLOGY

## 2024-10-07 PROCEDURE — 58558 HYSTEROSCOPY BIOPSY: CPT | Performed by: OBSTETRICS & GYNECOLOGY

## 2024-10-07 PROCEDURE — 6370000000 HC RX 637 (ALT 250 FOR IP): Performed by: NURSE ANESTHETIST, CERTIFIED REGISTERED

## 2024-10-07 PROCEDURE — 7100000011 HC PHASE II RECOVERY - ADDTL 15 MIN: Performed by: OBSTETRICS & GYNECOLOGY

## 2024-10-07 PROCEDURE — 2580000003 HC RX 258: Performed by: NURSE ANESTHETIST, CERTIFIED REGISTERED

## 2024-10-07 PROCEDURE — 3600000013 HC SURGERY LEVEL 3 ADDTL 15MIN: Performed by: OBSTETRICS & GYNECOLOGY

## 2024-10-07 PROCEDURE — 6360000002 HC RX W HCPCS

## 2024-10-07 PROCEDURE — 7100000001 HC PACU RECOVERY - ADDTL 15 MIN: Performed by: OBSTETRICS & GYNECOLOGY

## 2024-10-07 PROCEDURE — 3600000003 HC SURGERY LEVEL 3 BASE: Performed by: OBSTETRICS & GYNECOLOGY

## 2024-10-07 PROCEDURE — 7100000000 HC PACU RECOVERY - FIRST 15 MIN: Performed by: OBSTETRICS & GYNECOLOGY

## 2024-10-07 PROCEDURE — 3700000001 HC ADD 15 MINUTES (ANESTHESIA): Performed by: OBSTETRICS & GYNECOLOGY

## 2024-10-07 PROCEDURE — 3700000000 HC ANESTHESIA ATTENDED CARE: Performed by: OBSTETRICS & GYNECOLOGY

## 2024-10-07 PROCEDURE — 2709999900 HC NON-CHARGEABLE SUPPLY: Performed by: OBSTETRICS & GYNECOLOGY

## 2024-10-07 PROCEDURE — 88305 TISSUE EXAM BY PATHOLOGIST: CPT

## 2024-10-07 RX ORDER — SODIUM CHLORIDE 0.9 % (FLUSH) 0.9 %
5-40 SYRINGE (ML) INJECTION PRN
Status: DISCONTINUED | OUTPATIENT
Start: 2024-10-07 | End: 2024-10-07 | Stop reason: HOSPADM

## 2024-10-07 RX ORDER — ONDANSETRON 2 MG/ML
INJECTION INTRAMUSCULAR; INTRAVENOUS
Status: DISCONTINUED | OUTPATIENT
Start: 2024-10-07 | End: 2024-10-07 | Stop reason: SDUPTHER

## 2024-10-07 RX ORDER — FENTANYL CITRATE 50 UG/ML
50 INJECTION, SOLUTION INTRAMUSCULAR; INTRAVENOUS EVERY 5 MIN PRN
Status: DISCONTINUED | OUTPATIENT
Start: 2024-10-07 | End: 2024-10-07 | Stop reason: HOSPADM

## 2024-10-07 RX ORDER — PROPOFOL 10 MG/ML
INJECTION, EMULSION INTRAVENOUS
Status: DISCONTINUED | OUTPATIENT
Start: 2024-10-07 | End: 2024-10-07 | Stop reason: SDUPTHER

## 2024-10-07 RX ORDER — KETOROLAC TROMETHAMINE 10 MG/1
10 TABLET, FILM COATED ORAL EVERY 6 HOURS PRN
Qty: 12 TABLET | Refills: 0 | Status: SHIPPED | OUTPATIENT
Start: 2024-10-07

## 2024-10-07 RX ORDER — DIMENHYDRINATE 50 MG
50 TABLET ORAL ONCE
Status: COMPLETED | OUTPATIENT
Start: 2024-10-07 | End: 2024-10-07

## 2024-10-07 RX ORDER — MIDAZOLAM HYDROCHLORIDE 1 MG/ML
INJECTION INTRAMUSCULAR; INTRAVENOUS
Status: DISCONTINUED | OUTPATIENT
Start: 2024-10-07 | End: 2024-10-07 | Stop reason: SDUPTHER

## 2024-10-07 RX ORDER — SODIUM CHLORIDE 9 MG/ML
INJECTION, SOLUTION INTRAVENOUS PRN
Status: DISCONTINUED | OUTPATIENT
Start: 2024-10-07 | End: 2024-10-07 | Stop reason: HOSPADM

## 2024-10-07 RX ORDER — SODIUM CHLORIDE, SODIUM LACTATE, POTASSIUM CHLORIDE, CALCIUM CHLORIDE 600; 310; 30; 20 MG/100ML; MG/100ML; MG/100ML; MG/100ML
INJECTION, SOLUTION INTRAVENOUS CONTINUOUS
Status: DISCONTINUED | OUTPATIENT
Start: 2024-10-07 | End: 2024-10-07 | Stop reason: HOSPADM

## 2024-10-07 RX ORDER — SODIUM CHLORIDE 0.9 % (FLUSH) 0.9 %
5-40 SYRINGE (ML) INJECTION EVERY 12 HOURS SCHEDULED
Status: DISCONTINUED | OUTPATIENT
Start: 2024-10-07 | End: 2024-10-07 | Stop reason: HOSPADM

## 2024-10-07 RX ORDER — KETOROLAC TROMETHAMINE 30 MG/ML
INJECTION, SOLUTION INTRAMUSCULAR; INTRAVENOUS
Status: DISCONTINUED | OUTPATIENT
Start: 2024-10-07 | End: 2024-10-07 | Stop reason: SDUPTHER

## 2024-10-07 RX ORDER — FENTANYL CITRATE 50 UG/ML
25 INJECTION, SOLUTION INTRAMUSCULAR; INTRAVENOUS EVERY 5 MIN PRN
Status: DISCONTINUED | OUTPATIENT
Start: 2024-10-07 | End: 2024-10-07 | Stop reason: HOSPADM

## 2024-10-07 RX ORDER — ONDANSETRON 2 MG/ML
4 INJECTION INTRAMUSCULAR; INTRAVENOUS
Status: DISCONTINUED | OUTPATIENT
Start: 2024-10-07 | End: 2024-10-07 | Stop reason: HOSPADM

## 2024-10-07 RX ORDER — OXYCODONE HYDROCHLORIDE 5 MG/1
5 TABLET ORAL
Status: DISCONTINUED | OUTPATIENT
Start: 2024-10-07 | End: 2024-10-07 | Stop reason: HOSPADM

## 2024-10-07 RX ORDER — FENTANYL CITRATE 50 UG/ML
INJECTION, SOLUTION INTRAMUSCULAR; INTRAVENOUS
Status: DISCONTINUED | OUTPATIENT
Start: 2024-10-07 | End: 2024-10-07 | Stop reason: SDUPTHER

## 2024-10-07 RX ORDER — NALOXONE HYDROCHLORIDE 0.4 MG/ML
INJECTION, SOLUTION INTRAMUSCULAR; INTRAVENOUS; SUBCUTANEOUS PRN
Status: DISCONTINUED | OUTPATIENT
Start: 2024-10-07 | End: 2024-10-07 | Stop reason: HOSPADM

## 2024-10-07 RX ORDER — ACETAMINOPHEN 325 MG/1
650 TABLET ORAL ONCE
Status: COMPLETED | OUTPATIENT
Start: 2024-10-07 | End: 2024-10-07

## 2024-10-07 RX ORDER — KETOROLAC TROMETHAMINE 10 MG/1
20 TABLET, FILM COATED ORAL ONCE
Qty: 2 TABLET | Refills: 0 | Status: SHIPPED | OUTPATIENT
Start: 2024-10-07 | End: 2024-10-07

## 2024-10-07 RX ORDER — METOCLOPRAMIDE HYDROCHLORIDE 5 MG/ML
10 INJECTION INTRAMUSCULAR; INTRAVENOUS
Status: DISCONTINUED | OUTPATIENT
Start: 2024-10-07 | End: 2024-10-07 | Stop reason: HOSPADM

## 2024-10-07 RX ADMIN — MIDAZOLAM 2 MG: 1 INJECTION INTRAMUSCULAR; INTRAVENOUS at 14:29

## 2024-10-07 RX ADMIN — SODIUM CHLORIDE, POTASSIUM CHLORIDE, SODIUM LACTATE AND CALCIUM CHLORIDE: 600; 310; 30; 20 INJECTION, SOLUTION INTRAVENOUS at 11:27

## 2024-10-07 RX ADMIN — PROPOFOL 100 MG: 10 INJECTION, EMULSION INTRAVENOUS at 14:34

## 2024-10-07 RX ADMIN — DIMENHYDRINATE 50 MG: 50 TABLET ORAL at 11:20

## 2024-10-07 RX ADMIN — FENTANYL CITRATE 50 MCG: 50 INJECTION INTRAMUSCULAR; INTRAVENOUS at 14:45

## 2024-10-07 RX ADMIN — PROPOFOL 150 MCG/KG/MIN: 10 INJECTION, EMULSION INTRAVENOUS at 14:35

## 2024-10-07 RX ADMIN — FENTANYL CITRATE 50 MCG: 50 INJECTION INTRAMUSCULAR; INTRAVENOUS at 14:34

## 2024-10-07 RX ADMIN — KETOROLAC TROMETHAMINE 30 MG: 30 INJECTION, SOLUTION INTRAMUSCULAR at 14:49

## 2024-10-07 RX ADMIN — ONDANSETRON 4 MG: 2 INJECTION INTRAMUSCULAR; INTRAVENOUS at 14:44

## 2024-10-07 RX ADMIN — ACETAMINOPHEN 650 MG: 325 TABLET ORAL at 11:20

## 2024-10-07 ASSESSMENT — PAIN - FUNCTIONAL ASSESSMENT
PAIN_FUNCTIONAL_ASSESSMENT: NONE - DENIES PAIN
PAIN_FUNCTIONAL_ASSESSMENT: NONE - DENIES PAIN
PAIN_FUNCTIONAL_ASSESSMENT: FACE, LEGS, ACTIVITY, CRY, AND CONSOLABILITY (FLACC)

## 2024-10-07 NOTE — ANESTHESIA PRE PROCEDURE
Department of Anesthesiology  Preprocedure Note       Name:  Judi Elizalde   Age:  59 y.o.  :  1965                                          MRN:  [id number]         Date:  10/7/2024      Surgeon: Surgeon(s):  Carmita Petty DO    Procedure: Procedure(s):  DILATATION AND CURETTAGE HYSTEROSCOPY    Medications prior to admission:   Prior to Admission medications    Medication Sig Start Date End Date Taking? Authorizing Provider   Turmeric (QC TUMERIC COMPLEX PO) Take by mouth    Ally Garcia MD   Probiotic Acidophilus (FLORANEX) TABS Take 1 tablet by mouth daily    Ally Garcia MD   vitamin C (ASCORBIC ACID) 500 MG tablet Take 1 tablet by mouth daily    Ally Garcia MD   MULTIPLE VITAMIN PO Take 2 tablets by mouth daily    Ally Garcia MD       Current medications:    Current Facility-Administered Medications   Medication Dose Route Frequency Provider Last Rate Last Admin    lactated ringers IV soln infusion   IntraVENous Continuous Darrell Gilliam APRN - CRNA 100 mL/hr at 10/07/24 1127 New Bag at 10/07/24 1127    sodium chloride flush 0.9 % injection 5-40 mL  5-40 mL IntraVENous 2 times per day Delores Wells PA-C        sodium chloride flush 0.9 % injection 5-40 mL  5-40 mL IntraVENous PRN Delores Wells PA-C        0.9 % sodium chloride infusion   IntraVENous PRN Delores Wells PA-C           Allergies:    Allergies   Allergen Reactions    Penicillins     Vancomycin     Wellbutrin [Bupropion]        Problem List:    Patient Active Problem List   Diagnosis Code    GERD (gastroesophageal reflux disease) K21.9    Thrombocytopenia, unspecified (HCC) D69.6       Past Medical History:        Diagnosis Date    Hearing loss     Iron deficiency anemia     Ovarian cyst     Reflux gastritis        Past Surgical History:        Procedure Laterality Date     SECTION      x2    COLONOSCOPY      2005    CYST REMOVAL Right     ovary    MA COLON

## 2024-10-07 NOTE — OP NOTE
Preoperative diagnosis: Postmenopausal bleeding  Thickened endometrium    Postoperative diagnosis: Same    Procedure: Hysteroscopy D&C    Surgeon: Dr. Carmita Bradley    Assistant:  Emily Bruce PGY2    Anesthesia: Gen.    Estimated blood loss: 10 mL    Fluids: Lactated Ringer's    Condition: Stable    Complications: None    Specimen: Endometrial curettings    Findings: Patient had an approximately 7 cm uterus with left cornual polypoid lesion    Procedure: The patient was taken to the operating room where she was prepped and draped usual sterile fashion.  A weighted speculum was placed in the patient's vagina and the anterior lip of the cervix was grasped with a single-tooth tenaculum.  The cervix was progressively dilated.  The uterus was sounded and a hysteroscope was introduced with the findings noted above.  The hysteroscope was then removed.  A gentle sharp curettage was performed.  Once this was completed, the tenaculum was released with hemostasis being noted and the weighted speculum was removed.  Sponge, lap, needle, and instrument counts were correct ×2.  The patient was taken to the recovery area in apparently stable condition.

## 2024-10-07 NOTE — ANESTHESIA POSTPROCEDURE EVALUATION
Department of Anesthesiology  Postprocedure Note    Patient: Judi Elizalde  MRN: [id number]  YOB: 1965  Date of evaluation: 10/7/2024    Procedure Summary       Date: 10/07/24 Room / Location: [address]    Anesthesia Start: 1425 Anesthesia Stop: 1504    Procedure: DILATATION AND CURETTAGE HYSTEROSCOPY (Uterus) Diagnosis:       Postmenopausal bleeding      (Postmenopausal bleeding [N95.0])    Surgeons: Carmita Petty DO Responsible Provider: Sarah Montemayor APRN - CRNA    Anesthesia Type: General, TIVA ASA Status: 2            Anesthesia Type: General, TIVA    Nadja Phase I: Nadja Score: 10    Nadja Phase II: Nadja Score: 10    Anesthesia Post Evaluation    Patient location during evaluation: bedside  Patient participation: complete - patient participated  Level of consciousness: awake and alert  Pain score: 0  Airway patency: patent  Nausea & Vomiting: no nausea and no vomiting  Cardiovascular status: hemodynamically stable  Respiratory status: acceptable  Hydration status: stable  Pain management: adequate        No notable events documented.

## 2024-10-07 NOTE — PROGRESS NOTES
Patient verbalizes readiness for discharge. Discharge instructions given to patient and responsible adult, answered all questions, and verbalized understanding of discharge instructions.         Discharge Criteria    Inpatients must meet Criteria 1 through 7. All other patients are either YES or N/A. If a NO is chosen then Anesthesia or Surgeon must be notified.      1.  Minimum 30 minutes after last dose of sedative medication.    Yes      2.  Systolic BP between 90 - 160. Diastolic BP between 60 - 90.    Yes      3.  Pulse between 60 - 120    Yes      4.  Respirations between 8 - 25.    Yes      5.  SpO2 92% - 100%.    Yes      6.  Able to cough and swallow or return to baseline function.    Yes      7.  Alert and oriented or return to baseline mental status.    Yes      8.  Demonstrates controlled, coordinated movements, ambulates with steady gait, or return to baseline activity function.    Yes      9.  Minimal or no pain or nausea, or at a level tolerable and acceptable to patient.    Yes      10. Takes and retains oral fluids as allowed.    Yes      11. Procedural / perioperative site stable.  Minimal or no bleeding.    Yes          12. If GI endoscopy procedure, minimal or no abdominal distention or passing flatus.    N/A      13. Written discharge instructions and emergency telephone number provided.    Yes      14. Accompanied by a responsible adult.    Yes

## 2024-10-07 NOTE — DISCHARGE INSTRUCTIONS
SAME DAY SURGERY DISCHARGE INSTRUCTIONS    1.  Do not drive or operate hazardous machinery for 24 hours.    2.  Do not make important personal or business decisions for 24 hours.    3.  Do not drink alcoholic beverages for 24 hours.    4.  Do not smoke tobacco products for 24 hours.    5.  Eat light foods (Jell-O, soups, etc....) and drink plenty of fluids (water, Sprite, etc...) up to 8 glasses per day, as you can tolerate.    6.  Limit your activities for 24 hours.  Do not engage in heavy work until your surgeon gives you permission.      7.  Patient should not be left alone for 12-24 hours following surgical procedure.    8.  Wash hands before and after incision care.  It is important to practice good personal hygiene during the post op period.    9.  Notify your doctor immediately of any of the following:    Excessive swelling of, or around the wound area.    Redness.    Temperature of 100 degrees (F) or above.    Excessive pain.    Unable to urinate or empty bladder 4-6 hours after surgery.    Excessive bleeding at incision site.      10.  Call your surgeon for any questions regarding your surgery.    POST-OPERATIVE INSTRUCTIONS     Activity as tolerated; may shower and change dressings as needed.    Pain medication to be taken as directed for discomfort.    No sexual relations, douches, tampons, tub baths, swimming in ponds/pools, or hot tubs for 2 weeks or until seen by the doctor for your follow-up appointment.    May have some vaginal drainage for 1-2 weeks, call if excessive.    Call the office at [phone] (Mark)  [phone] (Juan Carlos) for an appointment in 2 weeks.

## 2024-10-07 NOTE — PROGRESS NOTES
Verbal order from LEONIE Smith that it will be okay to move the patient to phase 2 prior to 30 minutes since she is waking up easier.

## 2024-10-10 LAB — SURGICAL PATHOLOGY REPORT: NORMAL

## 2024-10-30 ENCOUNTER — OFFICE VISIT (OUTPATIENT)
Dept: OBGYN | Age: 59
End: 2024-10-30
Payer: COMMERCIAL

## 2024-10-30 VITALS
HEIGHT: 67 IN | DIASTOLIC BLOOD PRESSURE: 68 MMHG | SYSTOLIC BLOOD PRESSURE: 124 MMHG | BODY MASS INDEX: 29.19 KG/M2 | WEIGHT: 186 LBS

## 2024-10-30 DIAGNOSIS — N95.0 POSTMENOPAUSAL BLEEDING: Primary | ICD-10-CM

## 2024-10-30 DIAGNOSIS — R93.89 ENDOMETRIAL THICKENING ON ULTRASOUND: ICD-10-CM

## 2024-10-30 PROCEDURE — 99213 OFFICE O/P EST LOW 20 MIN: CPT

## 2024-11-15 ASSESSMENT — ENCOUNTER SYMPTOMS
NAUSEA: 0
VOMITING: 0

## 2024-11-16 NOTE — PROGRESS NOTES
DATE OF VISIT:  10/30/24    PATIENT NAME:  Judi Elizalde     YOB: 1965    REASON FOR VISIT:    Chief Complaint   Patient presents with    Post-Op Check     Hysteroscopy D&C was preformed on 10/7/24        HISTORY OF PRESENT ILLNESS:  Patient presents for postop visit.  Doing well.  Denies abnormal bleeding, discharge.  Discussed pathology report (benign endometrium w/ polyp).  Denies other concerns, complaints today.        Patient's last menstrual period was 11/09/2022 (approximate).  Vitals:    10/30/24 0916   BP: 124/68   Position: Sitting   Weight: 84.4 kg (186 lb)   Height: 1.702 m (5' 7\")     Body mass index is 29.13 kg/m².  Allergies   Allergen Reactions    Penicillins     Vancomycin     Wellbutrin [Bupropion]      Current Outpatient Medications   Medication Sig Dispense Refill    Turmeric (QC TUMERIC COMPLEX PO) Take by mouth      Probiotic Acidophilus (FLORANEX) TABS Take 1 tablet by mouth daily      vitamin C (ASCORBIC ACID) 500 MG tablet Take 1 tablet by mouth daily      MULTIPLE VITAMIN PO Take 2 tablets by mouth daily      ketorolac (TORADOL) 10 MG tablet Take 1 tablet by mouth every 6 hours as needed for Pain (Patient not taking: Reported on 10/30/2024) 12 tablet 0     No current facility-administered medications for this visit.     Social History     Socioeconomic History    Marital status:      Spouse name: None    Number of children: None    Years of education: None    Highest education level: None   Tobacco Use    Smoking status: Never    Smokeless tobacco: Never   Vaping Use    Vaping status: Never Used   Substance and Sexual Activity    Alcohol use: Yes     Comment: I may have a beer on few occasions during the week    Drug use: No    Sexual activity: Yes     Partners: Male     Social Determinants of Health     Financial Resource Strain: Low Risk  (4/19/2024)    Overall Financial Resource Strain (CARDIA)     Difficulty of Paying Living Expenses: Not hard at all   Food

## 2025-02-06 ENCOUNTER — OFFICE VISIT (OUTPATIENT)
Dept: OBGYN | Age: 60
End: 2025-02-06
Payer: COMMERCIAL

## 2025-02-06 VITALS
BODY MASS INDEX: 30.9 KG/M2 | SYSTOLIC BLOOD PRESSURE: 126 MMHG | DIASTOLIC BLOOD PRESSURE: 78 MMHG | HEIGHT: 64 IN | WEIGHT: 181 LBS

## 2025-02-06 DIAGNOSIS — Z01.419 ENCOUNTER FOR ANNUAL ROUTINE GYNECOLOGICAL EXAMINATION: Primary | ICD-10-CM

## 2025-02-06 DIAGNOSIS — Z12.31 SCREENING MAMMOGRAM, ENCOUNTER FOR: ICD-10-CM

## 2025-02-06 DIAGNOSIS — K60.2 RECTAL FISSURE: ICD-10-CM

## 2025-02-06 PROCEDURE — 99396 PREV VISIT EST AGE 40-64: CPT | Performed by: ADVANCED PRACTICE MIDWIFE

## 2025-02-06 RX ORDER — HYDROCORTISONE ACETATE PRAMOXINE HCL 2.5; 1 G/100G; G/100G
CREAM TOPICAL 3 TIMES DAILY
Qty: 30 G | Refills: 1 | Status: SHIPPED | OUTPATIENT
Start: 2025-02-06

## 2025-02-06 SDOH — ECONOMIC STABILITY: FOOD INSECURITY: WITHIN THE PAST 12 MONTHS, THE FOOD YOU BOUGHT JUST DIDN'T LAST AND YOU DIDN'T HAVE MONEY TO GET MORE.: NEVER TRUE

## 2025-02-06 SDOH — ECONOMIC STABILITY: FOOD INSECURITY: WITHIN THE PAST 12 MONTHS, YOU WORRIED THAT YOUR FOOD WOULD RUN OUT BEFORE YOU GOT MONEY TO BUY MORE.: NEVER TRUE

## 2025-02-06 ASSESSMENT — PATIENT HEALTH QUESTIONNAIRE - PHQ9
SUM OF ALL RESPONSES TO PHQ9 QUESTIONS 1 & 2: 0
SUM OF ALL RESPONSES TO PHQ QUESTIONS 1-9: 0
2. FEELING DOWN, DEPRESSED OR HOPELESS: NOT AT ALL
SUM OF ALL RESPONSES TO PHQ QUESTIONS 1-9: 0
1. LITTLE INTEREST OR PLEASURE IN DOING THINGS: NOT AT ALL
SUM OF ALL RESPONSES TO PHQ QUESTIONS 1-9: 0
SUM OF ALL RESPONSES TO PHQ QUESTIONS 1-9: 0

## 2025-02-06 NOTE — PROGRESS NOTES
YEARLY PHYSICAL    Date of service: 2025    Judi Elizalde  Is a 60 y.o.  , female    PT's PCP is: Amol Williamson, APRN - CNP     : 1965                                             Subjective:       Patient's last menstrual period was 2022 (approximate).     Are your menses regular: not applicable    OB History    Para Term  AB Living   4 2 2   2 2   SAB IAB Ectopic Molar Multiple Live Births   2         2      # Outcome Date GA Lbr Roger/2nd Weight Sex Type Anes PTL Lv   4 Term            3 Term            2 SAB            1 SAB                 Social History     Tobacco Use   Smoking Status Never   Smokeless Tobacco Never        Social History     Substance and Sexual Activity   Alcohol Use Yes    Comment: I may have a beer on few occasions during the week       Family History   Problem Relation Age of Onset    High Blood Pressure Mother     Gout Mother     High Blood Pressure Father     Other Father     Heart Disease Brother     Kidney Disease Maternal Grandmother         Genetic kidney disease    Stroke Maternal Grandfather     Heart Disease Maternal Grandfather         Heart attacks    Cancer Paternal Grandmother         Lung cancer    Heart Disease Paternal Grandfather         Heart attacks    Other Other         Pat. aunt breast cancer .No family h/o ovarian cancer or DVT.        Any family history of breast or ovarian cancer: No    Any family history of blood clots: No    Allergies: Penicillins, Vancomycin, and Wellbutrin [bupropion]      Current Outpatient Medications:     Hydrocort-Pramoxine, Perianal, (ANALPRAM-HC) 2.5-1 % rectal cream, Place rectally 3 times daily, Disp: 30 g, Rfl: 1    Turmeric (QC TUMERIC COMPLEX PO), Take by mouth, Disp: , Rfl:     Probiotic Acidophilus (FLORANEX) TABS, Take 1 tablet by mouth daily, Disp: , Rfl:     vitamin C (ASCORBIC ACID) 500 MG tablet, Take 1 tablet by mouth

## 2025-02-07 ASSESSMENT — ENCOUNTER SYMPTOMS
SHORTNESS OF BREATH: 0
ABDOMINAL PAIN: 0
BACK PAIN: 0
RECTAL PAIN: 1

## 2025-03-06 ENCOUNTER — OFFICE VISIT (OUTPATIENT)
Dept: PRIMARY CARE CLINIC | Age: 60
End: 2025-03-06
Payer: COMMERCIAL

## 2025-03-06 VITALS
OXYGEN SATURATION: 100 % | WEIGHT: 181.8 LBS | TEMPERATURE: 98 F | HEART RATE: 83 BPM | SYSTOLIC BLOOD PRESSURE: 120 MMHG | DIASTOLIC BLOOD PRESSURE: 68 MMHG | BODY MASS INDEX: 31.21 KG/M2

## 2025-03-06 DIAGNOSIS — J34.89 LESION OF NOSE: Primary | ICD-10-CM

## 2025-03-06 DIAGNOSIS — D69.6 THROMBOCYTOPENIA, UNSPECIFIED: ICD-10-CM

## 2025-03-06 PROCEDURE — 99214 OFFICE O/P EST MOD 30 MIN: CPT | Performed by: NURSE PRACTITIONER

## 2025-03-06 SDOH — ECONOMIC STABILITY: FOOD INSECURITY: WITHIN THE PAST 12 MONTHS, YOU WORRIED THAT YOUR FOOD WOULD RUN OUT BEFORE YOU GOT MONEY TO BUY MORE.: NEVER TRUE

## 2025-03-06 SDOH — ECONOMIC STABILITY: FOOD INSECURITY: WITHIN THE PAST 12 MONTHS, THE FOOD YOU BOUGHT JUST DIDN'T LAST AND YOU DIDN'T HAVE MONEY TO GET MORE.: NEVER TRUE

## 2025-03-06 ASSESSMENT — ENCOUNTER SYMPTOMS
COUGH: 0
NAUSEA: 0
SHORTNESS OF BREATH: 0
DIARRHEA: 0
RHINORRHEA: 0
WHEEZING: 0
SORE THROAT: 0
ROS SKIN COMMENTS: NOSE LESION
CONSTIPATION: 0
ABDOMINAL PAIN: 0
VOMITING: 0

## 2025-03-06 ASSESSMENT — PATIENT HEALTH QUESTIONNAIRE - PHQ9
SUM OF ALL RESPONSES TO PHQ QUESTIONS 1-9: 0
1. LITTLE INTEREST OR PLEASURE IN DOING THINGS: NOT AT ALL
SUM OF ALL RESPONSES TO PHQ QUESTIONS 1-9: 0
SUM OF ALL RESPONSES TO PHQ QUESTIONS 1-9: 0
2. FEELING DOWN, DEPRESSED OR HOPELESS: NOT AT ALL
SUM OF ALL RESPONSES TO PHQ QUESTIONS 1-9: 0

## 2025-03-06 NOTE — PROGRESS NOTES
Prescriptions      No prescriptions requested or ordered in this encounter         Return in about 1 year (around 3/6/2026) for Check up.                   Additional History: Patient was tested for Strep and it was negative \\nTreated with a Steroid pack for her sore throat, did not clear rash

## 2025-03-06 NOTE — PATIENT INSTRUCTIONS
SURVEY:     You may be receiving a survey from [facility] [employer] regarding your visit today.     Please complete the survey to enable us to provide the highest quality of care to you and your family.     If you cannot score us a very good on any question, please call the office to discuss how we could have made your experience a very good one.     Thank you,    Amol Williamson, APRN-CNP  Whit Keller, APRN-CNP  Carrie, LPN  Aleksandra, CMA  Per, CMA  Malathi, CMA  Kanwal, PCA  Carleen, CMA  Heatehr, PM

## 2025-03-14 ENCOUNTER — HOSPITAL ENCOUNTER (OUTPATIENT)
Age: 60
Setting detail: SPECIMEN
Discharge: HOME | End: 2025-03-14

## 2025-03-14 DIAGNOSIS — D69.6 THROMBOCYTOPENIA, UNSPECIFIED: ICD-10-CM

## 2025-03-14 LAB
ANION GAP SERPL CALCULATED.3IONS-SCNC: 12 MMOL/L (ref 9–16)
BUN SERPL-MCNC: 17 MG/DL (ref 8–23)
CALCIUM SERPL-MCNC: 9.4 MG/DL (ref 8.6–10.4)
CHLORIDE SERPL-SCNC: 103 MMOL/L (ref 98–107)
CO2 SERPL-SCNC: 25 MMOL/L (ref 20–31)
CREAT SERPL-MCNC: 0.8 MG/DL (ref 0.6–0.9)
GFR, ESTIMATED: 84 ML/MIN/1.73M2
GLUCOSE SERPL-MCNC: 99 MG/DL (ref 74–99)
POTASSIUM SERPL-SCNC: 3.9 MMOL/L (ref 3.7–5.3)
SODIUM SERPL-SCNC: 140 MMOL/L (ref 136–145)

## 2025-03-15 LAB
BASOPHILS # BLD: 0.05 K/UL (ref 0–0.2)
BASOPHILS NFR BLD: 1 % (ref 0–2)
EOSINOPHIL # BLD: 0.15 K/UL (ref 0–0.44)
EOSINOPHILS RELATIVE PERCENT: 3 % (ref 1–4)
ERYTHROCYTE [DISTWIDTH] IN BLOOD BY AUTOMATED COUNT: 12.5 % (ref 11.8–14.4)
HCT VFR BLD AUTO: 44.2 % (ref 36.3–47.1)
HGB BLD-MCNC: 14.3 G/DL (ref 11.9–15.1)
IMM GRANULOCYTES # BLD AUTO: <0.03 K/UL (ref 0–0.3)
IMM GRANULOCYTES NFR BLD: 0 %
LYMPHOCYTES NFR BLD: 1.51 K/UL (ref 1.1–3.7)
LYMPHOCYTES RELATIVE PERCENT: 27 % (ref 24–43)
MCH RBC QN AUTO: 29.4 PG (ref 25.2–33.5)
MCHC RBC AUTO-ENTMCNC: 32.4 G/DL (ref 28.4–34.8)
MCV RBC AUTO: 90.8 FL (ref 82.6–102.9)
MONOCYTES NFR BLD: 0.46 K/UL (ref 0.1–1.2)
MONOCYTES NFR BLD: 8 % (ref 3–12)
NEUTROPHILS NFR BLD: 61 % (ref 36–65)
NEUTS SEG NFR BLD: 3.38 K/UL (ref 1.5–8.1)
NRBC BLD-RTO: 0 PER 100 WBC
PLATELET # BLD AUTO: 156 K/UL (ref 138–453)
PMV BLD AUTO: 10 FL (ref 8.1–13.5)
RBC # BLD AUTO: 4.87 M/UL (ref 3.95–5.11)
WBC OTHER # BLD: 5.6 K/UL (ref 3.5–11.3)

## 2025-03-17 ENCOUNTER — RESULTS FOLLOW-UP (OUTPATIENT)
Dept: PRIMARY CARE CLINIC | Age: 60
End: 2025-03-17

## 2025-03-17 NOTE — TELEPHONE ENCOUNTER
----- Message from JUSTIN Silverio CNP sent at 3/17/2025  1:18 PM EDT -----  Please notify patient of stable lab results.  Thanks Whit

## 2025-05-12 ENCOUNTER — HOSPITAL ENCOUNTER (OUTPATIENT)
Dept: WOMENS IMAGING | Age: 60
Discharge: HOME | End: 2025-05-14
Attending: ADVANCED PRACTICE MIDWIFE
Payer: COMMERCIAL

## 2025-05-12 VITALS — WEIGHT: 175 LBS | BODY MASS INDEX: 27.47 KG/M2 | HEIGHT: 67 IN

## 2025-05-12 DIAGNOSIS — Z12.31 VISIT FOR SCREENING MAMMOGRAM: ICD-10-CM

## 2025-05-12 DIAGNOSIS — Z12.31 SCREENING MAMMOGRAM, ENCOUNTER FOR: ICD-10-CM

## 2025-05-12 PROCEDURE — 77063 BREAST TOMOSYNTHESIS BI: CPT

## (undated) DEVICE — GLOVE SURG SZ 65 CRM LTX FREE POLYISOPRENE POLYMER BEAD ANTI

## (undated) DEVICE — SOLUTION IRRIG 1000ML 0.9% SOD CHL USP POUR PLAS BTL

## (undated) DEVICE — Y-TYPE TUR/BLADDER IRRIGATION SET, REGULATING CLAMP

## (undated) DEVICE — CANNULA ORAL NSL AD CO2 N INTUB O2 DEL DISP TRU LNK

## (undated) DEVICE — MEDI-VAC NON-CONDUCTIVE TUBING7MM X 30.5 (100FT): Brand: CARDINAL HEALTH

## (undated) DEVICE — SOLUTION IV IRRIG POUR BRL 0.9% SODIUM CHL 2F7124

## (undated) DEVICE — 1000ML,PRESSURE INFUSER W/STOPCOCK: Brand: MEDLINE

## (undated) DEVICE — NEEDLE 25GAX5.0MM INJ CARR LOCKE

## (undated) DEVICE — SOLUTION IV 1000ML 0.9% SOD CHL FOR IRRIG PLAS CONT

## (undated) DEVICE — MERCY TIFFIN LITHOTOMY: Brand: MEDLINE INDUSTRIES, INC.